# Patient Record
Sex: FEMALE | Employment: FULL TIME
[De-identification: names, ages, dates, MRNs, and addresses within clinical notes are randomized per-mention and may not be internally consistent; named-entity substitution may affect disease eponyms.]

---

## 2024-07-27 ENCOUNTER — APPOINTMENT (OUTPATIENT)
Facility: HOSPITAL | Age: 64
End: 2024-07-27
Payer: COMMERCIAL

## 2024-07-27 ENCOUNTER — HOSPITAL ENCOUNTER (INPATIENT)
Facility: HOSPITAL | Age: 64
LOS: 5 days | Discharge: HOME | End: 2024-08-01
Attending: STUDENT IN AN ORGANIZED HEALTH CARE EDUCATION/TRAINING PROGRAM | Admitting: INTERNAL MEDICINE
Payer: COMMERCIAL

## 2024-07-27 DIAGNOSIS — A41.9 SEPTICEMIA (HCC): Primary | ICD-10-CM

## 2024-07-27 DIAGNOSIS — J18.9 PNEUMONIA OF LEFT LOWER LOBE DUE TO INFECTIOUS ORGANISM: ICD-10-CM

## 2024-07-27 PROBLEM — J90 PLEURAL EFFUSION ON LEFT: Status: ACTIVE | Noted: 2024-07-27

## 2024-07-27 LAB
ALBUMIN SERPL-MCNC: 2.7 G/DL (ref 3.4–5)
ALBUMIN/GLOB SERPL: 0.6 (ref 0.8–1.7)
ALP SERPL-CCNC: 91 U/L (ref 45–117)
ALT SERPL-CCNC: 58 U/L (ref 13–56)
ANION GAP SERPL CALC-SCNC: 12 MMOL/L (ref 3–18)
APPEARANCE UR: ABNORMAL
AST SERPL-CCNC: 94 U/L (ref 10–38)
BACTERIA URNS QL MICRO: ABNORMAL /HPF
BASOPHILS # BLD: 0 K/UL (ref 0–0.1)
BASOPHILS NFR BLD: 0 % (ref 0–2)
BILIRUB SERPL-MCNC: 1.9 MG/DL (ref 0.2–1)
BILIRUB UR QL: NEGATIVE
BUN SERPL-MCNC: 19 MG/DL (ref 7–18)
BUN/CREAT SERPL: 22 (ref 12–20)
CALCIUM SERPL-MCNC: 8.3 MG/DL (ref 8.5–10.1)
CHLORIDE SERPL-SCNC: 95 MMOL/L (ref 100–111)
CO2 SERPL-SCNC: 21 MMOL/L (ref 21–32)
COLOR UR: ABNORMAL
CREAT SERPL-MCNC: 0.86 MG/DL (ref 0.6–1.3)
DIFFERENTIAL METHOD BLD: ABNORMAL
EOSINOPHIL # BLD: 0 K/UL (ref 0–0.4)
EOSINOPHIL NFR BLD: 0 % (ref 0–5)
EPITH CASTS URNS QL MICRO: ABNORMAL /LPF (ref 0–5)
ERYTHROCYTE [DISTWIDTH] IN BLOOD BY AUTOMATED COUNT: 13.6 % (ref 11.6–14.5)
FLUAV AG NPH QL IA: NEGATIVE
FLUBV AG NOSE QL IA: NEGATIVE
GLOBULIN SER CALC-MCNC: 4.4 G/DL (ref 2–4)
GLUCOSE BLD STRIP.AUTO-MCNC: 116 MG/DL (ref 70–110)
GLUCOSE SERPL-MCNC: 102 MG/DL (ref 74–99)
GLUCOSE UR STRIP.AUTO-MCNC: NEGATIVE MG/DL
HCT VFR BLD AUTO: 31.3 % (ref 35–45)
HGB BLD-MCNC: 11 G/DL (ref 12–16)
HGB UR QL STRIP: ABNORMAL
IMM GRANULOCYTES # BLD AUTO: 0.1 K/UL (ref 0–0.04)
IMM GRANULOCYTES NFR BLD AUTO: 1 % (ref 0–0.5)
KETONES UR QL STRIP.AUTO: 15 MG/DL
LACTATE BLD-SCNC: 1.48 MMOL/L (ref 0.4–2)
LEUKOCYTE ESTERASE UR QL STRIP.AUTO: NEGATIVE
LYMPHOCYTES # BLD: 1.2 K/UL (ref 0.9–3.6)
LYMPHOCYTES NFR BLD: 8 % (ref 21–52)
MAGNESIUM SERPL-MCNC: 2.1 MG/DL (ref 1.6–2.6)
MCH RBC QN AUTO: 29.7 PG (ref 24–34)
MCHC RBC AUTO-ENTMCNC: 35.1 G/DL (ref 31–37)
MCV RBC AUTO: 84.6 FL (ref 78–100)
MONOCYTES # BLD: 1.2 K/UL (ref 0.05–1.2)
MONOCYTES NFR BLD: 9 % (ref 3–10)
NEUTS SEG # BLD: 11.3 K/UL (ref 1.8–8)
NEUTS SEG NFR BLD: 82 % (ref 40–73)
NITRITE UR QL STRIP.AUTO: NEGATIVE
NRBC # BLD: 0 K/UL (ref 0–0.01)
NRBC BLD-RTO: 0 PER 100 WBC
NT PRO BNP: 191 PG/ML (ref 0–900)
PH UR STRIP: 5.5 (ref 5–8)
PLATELET # BLD AUTO: 248 K/UL (ref 135–420)
PMV BLD AUTO: 10.6 FL (ref 9.2–11.8)
POTASSIUM SERPL-SCNC: 3.5 MMOL/L (ref 3.5–5.5)
PROCALCITONIN SERPL-MCNC: 2.66 NG/ML
PROT SERPL-MCNC: 7.1 G/DL (ref 6.4–8.2)
PROT UR STRIP-MCNC: ABNORMAL MG/DL
RBC # BLD AUTO: 3.7 M/UL (ref 4.2–5.3)
RBC #/AREA URNS HPF: ABNORMAL /HPF (ref 0–5)
SARS-COV-2 RDRP RESP QL NAA+PROBE: NOT DETECTED
SODIUM SERPL-SCNC: 128 MMOL/L (ref 136–145)
SOURCE: NORMAL
SP GR UR REFRACTOMETRY: 1.01 (ref 1–1.03)
TROPONIN I SERPL HS-MCNC: 13 NG/L (ref 0–54)
TSH SERPL DL<=0.05 MIU/L-ACNC: 1.01 UIU/ML (ref 0.36–3.74)
UROBILINOGEN UR QL STRIP.AUTO: 1 EU/DL (ref 0.2–1)
WBC # BLD AUTO: 13.8 K/UL (ref 4.6–13.2)
WBC URNS QL MICRO: ABNORMAL /HPF (ref 0–4)

## 2024-07-27 PROCEDURE — 84443 ASSAY THYROID STIM HORMONE: CPT

## 2024-07-27 PROCEDURE — 82962 GLUCOSE BLOOD TEST: CPT

## 2024-07-27 PROCEDURE — 2580000003 HC RX 258: Performed by: INTERNAL MEDICINE

## 2024-07-27 PROCEDURE — 80053 COMPREHEN METABOLIC PANEL: CPT

## 2024-07-27 PROCEDURE — 83605 ASSAY OF LACTIC ACID: CPT

## 2024-07-27 PROCEDURE — 71045 X-RAY EXAM CHEST 1 VIEW: CPT

## 2024-07-27 PROCEDURE — 81001 URINALYSIS AUTO W/SCOPE: CPT

## 2024-07-27 PROCEDURE — 87635 SARS-COV-2 COVID-19 AMP PRB: CPT

## 2024-07-27 PROCEDURE — 87040 BLOOD CULTURE FOR BACTERIA: CPT

## 2024-07-27 PROCEDURE — 93005 ELECTROCARDIOGRAM TRACING: CPT | Performed by: STUDENT IN AN ORGANIZED HEALTH CARE EDUCATION/TRAINING PROGRAM

## 2024-07-27 PROCEDURE — 85025 COMPLETE CBC W/AUTO DIFF WBC: CPT

## 2024-07-27 PROCEDURE — 96365 THER/PROPH/DIAG IV INF INIT: CPT

## 2024-07-27 PROCEDURE — 84145 PROCALCITONIN (PCT): CPT

## 2024-07-27 PROCEDURE — 84484 ASSAY OF TROPONIN QUANT: CPT

## 2024-07-27 PROCEDURE — 87804 INFLUENZA ASSAY W/OPTIC: CPT

## 2024-07-27 PROCEDURE — 6370000000 HC RX 637 (ALT 250 FOR IP): Performed by: STUDENT IN AN ORGANIZED HEALTH CARE EDUCATION/TRAINING PROGRAM

## 2024-07-27 PROCEDURE — 83880 ASSAY OF NATRIURETIC PEPTIDE: CPT

## 2024-07-27 PROCEDURE — 6370000000 HC RX 637 (ALT 250 FOR IP): Performed by: INTERNAL MEDICINE

## 2024-07-27 PROCEDURE — 96375 TX/PRO/DX INJ NEW DRUG ADDON: CPT

## 2024-07-27 PROCEDURE — 6360000002 HC RX W HCPCS: Performed by: STUDENT IN AN ORGANIZED HEALTH CARE EDUCATION/TRAINING PROGRAM

## 2024-07-27 PROCEDURE — 99285 EMERGENCY DEPT VISIT HI MDM: CPT

## 2024-07-27 PROCEDURE — 2580000003 HC RX 258: Performed by: STUDENT IN AN ORGANIZED HEALTH CARE EDUCATION/TRAINING PROGRAM

## 2024-07-27 PROCEDURE — 1100000000 HC RM PRIVATE

## 2024-07-27 PROCEDURE — 83735 ASSAY OF MAGNESIUM: CPT

## 2024-07-27 PROCEDURE — 6360000002 HC RX W HCPCS: Performed by: INTERNAL MEDICINE

## 2024-07-27 RX ORDER — ACETAMINOPHEN 650 MG/1
650 SUPPOSITORY RECTAL EVERY 6 HOURS PRN
Status: DISCONTINUED | OUTPATIENT
Start: 2024-07-27 | End: 2024-08-01 | Stop reason: HOSPADM

## 2024-07-27 RX ORDER — ENOXAPARIN SODIUM 100 MG/ML
40 INJECTION SUBCUTANEOUS DAILY
Status: DISCONTINUED | OUTPATIENT
Start: 2024-07-27 | End: 2024-08-01 | Stop reason: HOSPADM

## 2024-07-27 RX ORDER — POTASSIUM CHLORIDE 20 MEQ/1
40 TABLET, EXTENDED RELEASE ORAL PRN
Status: DISCONTINUED | OUTPATIENT
Start: 2024-07-27 | End: 2024-08-01 | Stop reason: HOSPADM

## 2024-07-27 RX ORDER — IPRATROPIUM BROMIDE AND ALBUTEROL SULFATE 2.5; .5 MG/3ML; MG/3ML
1 SOLUTION RESPIRATORY (INHALATION) EVERY 4 HOURS PRN
Status: DISCONTINUED | OUTPATIENT
Start: 2024-07-27 | End: 2024-07-28

## 2024-07-27 RX ORDER — SODIUM CHLORIDE 0.9 % (FLUSH) 0.9 %
5-40 SYRINGE (ML) INJECTION EVERY 12 HOURS SCHEDULED
Status: DISCONTINUED | OUTPATIENT
Start: 2024-07-27 | End: 2024-08-01 | Stop reason: HOSPADM

## 2024-07-27 RX ORDER — 0.9 % SODIUM CHLORIDE 0.9 %
30 INTRAVENOUS SOLUTION INTRAVENOUS ONCE
Status: COMPLETED | OUTPATIENT
Start: 2024-07-27 | End: 2024-07-27

## 2024-07-27 RX ORDER — ONDANSETRON 2 MG/ML
4 INJECTION INTRAMUSCULAR; INTRAVENOUS EVERY 6 HOURS PRN
Status: DISCONTINUED | OUTPATIENT
Start: 2024-07-27 | End: 2024-08-01 | Stop reason: HOSPADM

## 2024-07-27 RX ORDER — SODIUM CHLORIDE 9 MG/ML
INJECTION, SOLUTION INTRAVENOUS PRN
Status: DISCONTINUED | OUTPATIENT
Start: 2024-07-27 | End: 2024-08-01 | Stop reason: HOSPADM

## 2024-07-27 RX ORDER — POTASSIUM CHLORIDE 7.45 MG/ML
10 INJECTION INTRAVENOUS PRN
Status: DISCONTINUED | OUTPATIENT
Start: 2024-07-27 | End: 2024-08-01 | Stop reason: HOSPADM

## 2024-07-27 RX ORDER — ACETAMINOPHEN 500 MG
1000 TABLET ORAL
Status: COMPLETED | OUTPATIENT
Start: 2024-07-27 | End: 2024-07-27

## 2024-07-27 RX ORDER — MECOBALAMIN 5000 MCG
5 TABLET,DISINTEGRATING ORAL NIGHTLY PRN
Status: DISCONTINUED | OUTPATIENT
Start: 2024-07-27 | End: 2024-08-01 | Stop reason: HOSPADM

## 2024-07-27 RX ORDER — MAGNESIUM SULFATE IN WATER 40 MG/ML
2000 INJECTION, SOLUTION INTRAVENOUS PRN
Status: DISCONTINUED | OUTPATIENT
Start: 2024-07-27 | End: 2024-08-01 | Stop reason: HOSPADM

## 2024-07-27 RX ORDER — ACETAMINOPHEN 325 MG/1
650 TABLET ORAL EVERY 6 HOURS PRN
Status: DISCONTINUED | OUTPATIENT
Start: 2024-07-27 | End: 2024-08-01 | Stop reason: HOSPADM

## 2024-07-27 RX ORDER — ONDANSETRON 4 MG/1
4 TABLET, ORALLY DISINTEGRATING ORAL EVERY 8 HOURS PRN
Status: DISCONTINUED | OUTPATIENT
Start: 2024-07-27 | End: 2024-08-01 | Stop reason: HOSPADM

## 2024-07-27 RX ORDER — IPRATROPIUM BROMIDE AND ALBUTEROL SULFATE 2.5; .5 MG/3ML; MG/3ML
1 SOLUTION RESPIRATORY (INHALATION)
Status: DISCONTINUED | OUTPATIENT
Start: 2024-07-28 | End: 2024-07-28

## 2024-07-27 RX ORDER — GUAIFENESIN 600 MG/1
600 TABLET, EXTENDED RELEASE ORAL 2 TIMES DAILY
Status: DISCONTINUED | OUTPATIENT
Start: 2024-07-27 | End: 2024-07-28

## 2024-07-27 RX ORDER — SODIUM CHLORIDE 0.9 % (FLUSH) 0.9 %
5-40 SYRINGE (ML) INJECTION PRN
Status: DISCONTINUED | OUTPATIENT
Start: 2024-07-27 | End: 2024-08-01 | Stop reason: HOSPADM

## 2024-07-27 RX ORDER — IBUPROFEN 600 MG/1
600 TABLET ORAL
Status: COMPLETED | OUTPATIENT
Start: 2024-07-27 | End: 2024-07-27

## 2024-07-27 RX ORDER — POLYETHYLENE GLYCOL 3350 17 G/17G
17 POWDER, FOR SOLUTION ORAL DAILY PRN
Status: DISCONTINUED | OUTPATIENT
Start: 2024-07-27 | End: 2024-08-01 | Stop reason: HOSPADM

## 2024-07-27 RX ADMIN — GUAIFENESIN 600 MG: 600 TABLET, EXTENDED RELEASE ORAL at 21:40

## 2024-07-27 RX ADMIN — ENOXAPARIN SODIUM 40 MG: 100 INJECTION SUBCUTANEOUS at 21:40

## 2024-07-27 RX ADMIN — ACETAMINOPHEN 1000 MG: 500 TABLET ORAL at 09:20

## 2024-07-27 RX ADMIN — SODIUM CHLORIDE, PRESERVATIVE FREE 10 ML: 5 INJECTION INTRAVENOUS at 21:49

## 2024-07-27 RX ADMIN — SODIUM CHLORIDE 2000 ML: 9 INJECTION, SOLUTION INTRAVENOUS at 10:44

## 2024-07-27 RX ADMIN — IBUPROFEN 600 MG: 600 TABLET, FILM COATED ORAL at 09:20

## 2024-07-27 RX ADMIN — AZITHROMYCIN MONOHYDRATE 500 MG: 500 INJECTION, POWDER, LYOPHILIZED, FOR SOLUTION INTRAVENOUS at 10:48

## 2024-07-27 RX ADMIN — WATER 1000 MG: 1 INJECTION INTRAMUSCULAR; INTRAVENOUS; SUBCUTANEOUS at 10:45

## 2024-07-27 ASSESSMENT — PAIN SCALES - GENERAL
PAINLEVEL_OUTOF10: 0
PAINLEVEL_OUTOF10: 7

## 2024-07-27 ASSESSMENT — PAIN DESCRIPTION - LOCATION: LOCATION: HEAD

## 2024-07-27 ASSESSMENT — PAIN - FUNCTIONAL ASSESSMENT: PAIN_FUNCTIONAL_ASSESSMENT: 0-10

## 2024-07-27 ASSESSMENT — PAIN DESCRIPTION - DESCRIPTORS: DESCRIPTORS: ACHING

## 2024-07-27 NOTE — PLAN OF CARE
Problem: Pain  Goal: Verbalizes/displays adequate comfort level or baseline comfort level  Outcome: Progressing     Problem: Respiratory - Adult  Goal: Achieves optimal ventilation and oxygenation  Outcome: Progressing     Problem: Discharge Planning  Goal: Discharge to home or other facility with appropriate resources  Outcome: Progressing     Problem: Metabolic/Fluid and Electrolytes - Adult  Goal: Electrolytes maintained within normal limits  Outcome: Progressing  Goal: Hemodynamic stability and optimal renal function maintained  Outcome: Progressing  Goal: Glucose maintained within prescribed range  Outcome: Progressing

## 2024-07-27 NOTE — ED NOTES
TRANSFER - OUT REPORT:    Verbal report given to Maureen العراقي RN on Diana Gastelum  being transferred to [facility] for routine progression of patient care       Report consisted of patient's Situation, Background, Assessment and   Recommendations(SBAR).     Information from the following report(s) ED Encounter Summary was reviewed with the receiving nurse.    Horace Fall Assessment:    Presents to emergency department  because of falls (Syncope, seizure, or loss of consciousness): No  Age > 70: No  Altered Mental Status, Intoxication with alcohol or substance confusion (Disorientation, impaired judgment, poor safety awaremess, or inability to follow instructions): No  Impaired Mobility: Ambulates or transfers with assistive devices or assistance; Unable to ambulate or transer.: No  Nursing Judgement: No          Lines:   Peripheral IV 07/27/24 Right Arm (Active)   Site Assessment Clean, dry & intact;Dry 07/27/24 1043   Line Status Brisk blood return 07/27/24 1043   Line Care Connections checked and tightened 07/27/24 1043   Phlebitis Assessment No symptoms 07/27/24 1043   Infiltration Assessment 0 07/27/24 1043   Dressing Status Clean, dry & intact;Dry 07/27/24 1043   Dressing Type Transparent 07/27/24 1043        Opportunity for questions and clarification was provided.      Patient transported with:  Monitor

## 2024-07-27 NOTE — ED PROVIDER NOTES
LACTIC ACID (LACTATE)   POCT GLUCOSE       All other labs were within normal range or not returned as of this dictation.    EMERGENCY DEPARTMENT COURSE and DIFFERENTIAL DIAGNOSIS/MDM:   Vitals:    Vitals:    07/27/24 0945 07/27/24 1000 07/27/24 1030 07/27/24 1100   BP: 114/72 100/79 114/70    Pulse:   (!) 105    Resp:       Temp:    98.9 °F (37.2 °C)   TempSrc:    Oral   SpO2: 94% 93% 94%    Weight:       Height:           Medical Decision Making  Amount and/or Complexity of Data Reviewed  Labs: ordered.  Radiology: ordered.  ECG/medicine tests: ordered.    Risk  OTC drugs.  Prescription drug management.  Decision regarding hospitalization.    Patient is a 63-year-old female presenting with multiple complaints.  Patient could be suffering from a viral URI however concerning that she may be developing pneumonia.  She is currently febrile and tachycardic and has crackles on her lung exam.  Will do a code sepsis, patient be started on fluids and antibiotics.  Will evaluate for alternative sources for her symptoms.  She will be given Tylenol and ibuprofen to help bring down her fever.  She will be given ceftriaxone and azithromycin as I think that pneumonia is the most likely source of her infection.    Chest x-ray shows an obvious pneumonia to the left lower lobe    Lactic acid within normal limits.    Flu and COVID are negative.  CBC shows leukocytosis and normocytic anemia of unknown baseline.  TSH is within normal limits.  CMP shows a low sodium.  Patient already given fluids.  An elevated BUN again patient given fluids a low albumin and elevation in her LFTs.  Patient denies any alcohol use.  Denies any known liver issues.  Pro-Austin is markedly elevated at 2.66.  Troponin within normal limits.    Patient is maintaining with oxygen sats in the low 90s.  She is able to get up and ambulate and she does not become hypoxic stays in the low 90s.  Overall she states she feels improved and clinically she looks much better  after fluids and antibiotics.  Discussed different treatment options moving forward.  She does not have any significant medical history we did consider outpatient treatment for this however my concern is is that she could still be septic from this pneumonia, elevated LFTs and a low sodium, therefore think that she would be better served in the hospital.  Especially since she does not have a primary care physician it would be hard for her to get close outpatient follow-up.  Therefore patient agreed with this plan at this time.    Patient is admitted to Dr. Min with the hospitalist service.  Patient is in agreement with the plan to be admitted at this time.          CRITICAL CARE TIME   Total Critical Care time was 40 minutes, excluding separately reportable procedures.  There was a high probability of clinically significant/life threatening deterioration in the patient's condition which required my urgent intervention.     CONSULTS:  None    PROCEDURES:  Unless otherwise noted below, none     Procedures      FINAL IMPRESSION      1. Septicemia (HCC)    2. Pneumonia of left lower lobe due to infectious organism          DISPOSITION/PLAN   DISPOSITION Admitted 07/27/2024 01:21:03 PM      PATIENT REFERRED TO:  No follow-up provider specified.    DISCHARGE MEDICATIONS:  New Prescriptions    No medications on file     Controlled Substances Monitoring:          No data to display                (Please note that portions of this note were completed with a voice recognition program.  Efforts were made to edit the dictations but occasionally words are mis-transcribed.)    Maureen Herrera MD (electronically signed)  Attending Emergency Physician            Maureen Herrera MD  07/27/24 9262

## 2024-07-27 NOTE — H&P
History and Physical    Patient: Diana Gastelum MRN: [id number]  SSN: [id number]    YOB: 1960  Age: 63 y.o.  Sex: female      Subjective:      Diana Gastelum is a 63 y.o. female who presents to [facility] ([facility]) ER with complaint of Cough, Shortness of Breath, and Fatigue.  Patient reports that she has been having a cough productive of clear phlegm without chest congestion and without pain with inspiration, but with intermittent fevers and chills over approximately the last week.  Patient reports that she has been having a headache during this time period and diarrhea for approximately four days.  Patient reports that she has not taken any antibiotics recently.  Patient reports that she has a Past Medical History of Bilateral Hearing Loss for which she used to wear Hearing Aids (which she reportedly lost).  Patient states that she has a Past Medical History of Mastoidectomy.  Patient reports that she takes no medications at home and has no Home Pharmacy.  Patient reports that she Smokes 0.25-0.3 PPD x2 months and ostensibly started Smoking due to the recently loss of her  who passed away.  Patient denies nausea and dysuria.    In [facility] ER, Patient is noted to have Temperature 101.0°F, Heart Rate 117 bpm, Respiration Rate 22 bpm, Blood Pressure 100/66 mm Hg to 134/80 mm Hg (believe DBP of 107 mm Hg was erroneous), SpO2 93% on Room Air, WBC 13.8 K/uL, Hgb 11.0 g/dL, Neut% 82%, Neut# 11.3, UA shows small hematuria, Lactic Acid 1.48 mmol/L, Na+ 128 mmol/L, K+ 3.5 mmol/L, Cl- 95 mmol/L, BUN 19 mg/dL, Creatinine 0.86 mg/dL (Unknown), eGFR 77, Glucose 116 mg/dL, Albumin 2.7 g/dL, ALT 58 U/L, AST 94 U/L, Total Bilirubin 1.9 mg/dL, Troponin 13 ng/L, Pro- pg/mL, Procalcitonin 2.66 ng/mL, TSH 1.01 uIU/mL, Influenza A/B (-), and SARS-CoV-2 (-).  CXR has been read by Radiologist to show \"Fairly extensive region of airspace disease/infiltrate centered in the left

## 2024-07-27 NOTE — PROGRESS NOTES
4 Eyes Skin Assessment     NAME:  Diana Gastelum  YOB: 1960  MEDICAL RECORD NUMBER:  [id number]    The patient is being assessed for  Admission    I agree that at least one RN has performed a thorough Head to Toe Skin Assessment on the patient. ALL assessment sites listed below have been assessed.      Areas assessed by both nurses:    Head, Face, Ears, Shoulders, Back, Chest, Arms, Elbows, Hands, Sacrum. Buttock, Coccyx, Ischium, Legs. Feet and Heels, and Under Medical Devices         Does the Patient have a Wound? No noted wound(s)       Nader Prevention initiated by RN: No  Wound Care Orders initiated by RN: No    Pressure Injury (Stage 3,4, Unstageable, DTI, NWPT, and Complex wounds) if present, place Wound referral order by RN under : No    New Ostomies, if present place, Ostomy referral order under : No     Nurse 1 eSignature: Electronically signed by Maureen العراقي RN on 7/27/24 at 6:28 PM EDT    **SHARE this note so that the co-signing nurse can place an eSignature**    Nurse 2 eSignature: Electronically signed by Milena Booth RN on 7/27/24 at 6:32 PM EDT

## 2024-07-27 NOTE — PROGRESS NOTES
INTERIM UPDATE - 1320 EST on 7/27/2024    [facility] ([facility]) ER Physician calls requesting admission for a 63-year-old female who presents with Cough, Shortness of Breath, and Fatigue.  Patient reportedly had been having approximately 1 week of symptoms and also some diarrhea.    [facility] ER Physician states that Patient has SpO2 94% on Room Air and is moving around well.  However, Patient has a Temperature of 101.0°F, Tachypnea (22 bpm), Tachycardia (117 bpm) with a negative Lactic Acid (1.48 mmol/L).  CXR reportedly shows a Left Mid-lung infiltration.  Patient otherwise has a CURB-65 score of 0.    [facility] ER Physician reports that Patient has no Primary Care Provider (a.k.a. PCP).  [facility] ER Physician reports that there is \"nothing in Patient's other Chart\" to review.    [facility] ER Physician states that Patient was covered with IV Ceftriaxone and IV Azithromycin.  Patient received no outpatient therapy.    [facility] ([facility]) ER Physician reports that Patient is hemodynamically stable, requires no ancillary oxygen, and exhibits no arrhythmia.    Plan:  Will admit Patient to [facility] ([facility]) Medical/Surgical Unit for management of Sepsis 2°/2 Left Community-Acquired Pneumonia (a.k.a. CAP).

## 2024-07-27 NOTE — ED TRIAGE NOTES
Patient arrived to ER via EMS from [facility]. Patient states she has been feeling sick for a week, heart rate elevated and low oxygen so UC called EMS. Patient arrived to ER ST, oxygen 94% on room air.

## 2024-07-28 PROBLEM — H91.93 BILATERAL HEARING LOSS: Status: ACTIVE | Noted: 2024-07-28

## 2024-07-28 PROBLEM — J98.01 ACUTE BRONCHOSPASM: Status: ACTIVE | Noted: 2024-07-28

## 2024-07-28 PROBLEM — D64.9 NORMOCYTIC ANEMIA: Status: ACTIVE | Noted: 2024-07-28

## 2024-07-28 PROBLEM — Z72.0 TOBACCO USE: Status: ACTIVE | Noted: 2024-07-28

## 2024-07-28 PROBLEM — Z72.0 TOBACCO USE: Chronic | Status: ACTIVE | Noted: 2024-07-28

## 2024-07-28 PROBLEM — J98.01 ACUTE BRONCHOSPASM: Status: RESOLVED | Noted: 2024-07-28 | Resolved: 2024-07-28

## 2024-07-28 PROBLEM — E87.6 HYPOKALEMIA: Status: ACTIVE | Noted: 2024-07-28

## 2024-07-28 PROBLEM — H91.93 BILATERAL HEARING LOSS: Chronic | Status: ACTIVE | Noted: 2024-07-28

## 2024-07-28 PROBLEM — E87.1 HYPONATREMIA: Status: ACTIVE | Noted: 2024-07-28

## 2024-07-28 LAB
ALBUMIN SERPL-MCNC: 2.2 G/DL (ref 3.4–5)
ALBUMIN/GLOB SERPL: 0.5 (ref 0.8–1.7)
ALP SERPL-CCNC: 76 U/L (ref 45–117)
ALT SERPL-CCNC: 40 U/L (ref 13–56)
ANION GAP SERPL CALC-SCNC: 8 MMOL/L (ref 3–18)
AST SERPL-CCNC: 59 U/L (ref 10–38)
B PERT DNA SPEC QL NAA+PROBE: NOT DETECTED
BASOPHILS # BLD: 0 K/UL (ref 0–0.1)
BASOPHILS NFR BLD: 0 % (ref 0–2)
BILIRUB SERPL-MCNC: 0.9 MG/DL (ref 0.2–1)
BORDETELLA PARAPERTUSSIS BY PCR: NOT DETECTED
BUN SERPL-MCNC: 16 MG/DL (ref 7–18)
BUN/CREAT SERPL: 25 (ref 12–20)
C PNEUM DNA SPEC QL NAA+PROBE: NOT DETECTED
CALCIUM SERPL-MCNC: 8.7 MG/DL (ref 8.5–10.1)
CHLORIDE SERPL-SCNC: 103 MMOL/L (ref 100–111)
CO2 SERPL-SCNC: 23 MMOL/L (ref 21–32)
CREAT SERPL-MCNC: 0.64 MG/DL (ref 0.6–1.3)
DIFFERENTIAL METHOD BLD: ABNORMAL
EKG ATRIAL RATE: 118 BPM
EKG DIAGNOSIS: NORMAL
EKG P AXIS: 62 DEGREES
EKG P-R INTERVAL: 126 MS
EKG Q-T INTERVAL: 284 MS
EKG QRS DURATION: 84 MS
EKG QTC CALCULATION (BAZETT): 398 MS
EKG R AXIS: 51 DEGREES
EKG T AXIS: -25 DEGREES
EKG VENTRICULAR RATE: 118 BPM
EOSINOPHIL # BLD: 0 K/UL (ref 0–0.4)
EOSINOPHIL NFR BLD: 0 % (ref 0–5)
ERYTHROCYTE [DISTWIDTH] IN BLOOD BY AUTOMATED COUNT: 13.8 % (ref 11.6–14.5)
FLUAV SUBTYP SPEC NAA+PROBE: NOT DETECTED
FLUBV RNA SPEC QL NAA+PROBE: NOT DETECTED
GLOBULIN SER CALC-MCNC: 4.6 G/DL (ref 2–4)
GLUCOSE SERPL-MCNC: 101 MG/DL (ref 74–99)
HADV DNA SPEC QL NAA+PROBE: NOT DETECTED
HCOV 229E RNA SPEC QL NAA+PROBE: NOT DETECTED
HCOV HKU1 RNA SPEC QL NAA+PROBE: NOT DETECTED
HCOV NL63 RNA SPEC QL NAA+PROBE: NOT DETECTED
HCOV OC43 RNA SPEC QL NAA+PROBE: NOT DETECTED
HCT VFR BLD AUTO: 29.6 % (ref 35–45)
HGB BLD-MCNC: 9.9 G/DL (ref 12–16)
HMPV RNA SPEC QL NAA+PROBE: NOT DETECTED
HPIV1 RNA SPEC QL NAA+PROBE: NOT DETECTED
HPIV2 RNA SPEC QL NAA+PROBE: NOT DETECTED
HPIV3 RNA SPEC QL NAA+PROBE: NOT DETECTED
HPIV4 RNA SPEC QL NAA+PROBE: NOT DETECTED
IMM GRANULOCYTES # BLD AUTO: 0.1 K/UL (ref 0–0.04)
IMM GRANULOCYTES NFR BLD AUTO: 1 % (ref 0–0.5)
LYMPHOCYTES # BLD: 1.2 K/UL (ref 0.9–3.6)
LYMPHOCYTES NFR BLD: 12 % (ref 21–52)
M PNEUMO DNA SPEC QL NAA+PROBE: NOT DETECTED
MAGNESIUM SERPL-MCNC: 2.4 MG/DL (ref 1.6–2.6)
MCH RBC QN AUTO: 29.1 PG (ref 24–34)
MCHC RBC AUTO-ENTMCNC: 33.4 G/DL (ref 31–37)
MCV RBC AUTO: 87.1 FL (ref 78–100)
MONOCYTES # BLD: 0.9 K/UL (ref 0.05–1.2)
MONOCYTES NFR BLD: 9 % (ref 3–10)
NEUTS SEG # BLD: 7.9 K/UL (ref 1.8–8)
NEUTS SEG NFR BLD: 78 % (ref 40–73)
NRBC # BLD: 0 K/UL (ref 0–0.01)
NRBC BLD-RTO: 0 PER 100 WBC
PLATELET # BLD AUTO: 249 K/UL (ref 135–420)
PMV BLD AUTO: 10.7 FL (ref 9.2–11.8)
POTASSIUM SERPL-SCNC: 3.3 MMOL/L (ref 3.5–5.5)
PROT SERPL-MCNC: 6.8 G/DL (ref 6.4–8.2)
RBC # BLD AUTO: 3.4 M/UL (ref 4.2–5.3)
RSV RNA SPEC QL NAA+PROBE: NOT DETECTED
RV+EV RNA SPEC QL NAA+PROBE: NOT DETECTED
SARS-COV-2 RNA RESP QL NAA+PROBE: NOT DETECTED
SODIUM SERPL-SCNC: 134 MMOL/L (ref 136–145)
WBC # BLD AUTO: 10.2 K/UL (ref 4.6–13.2)

## 2024-07-28 PROCEDURE — 6370000000 HC RX 637 (ALT 250 FOR IP): Performed by: STUDENT IN AN ORGANIZED HEALTH CARE EDUCATION/TRAINING PROGRAM

## 2024-07-28 PROCEDURE — 36415 COLL VENOUS BLD VENIPUNCTURE: CPT

## 2024-07-28 PROCEDURE — 87324 CLOSTRIDIUM AG IA: CPT

## 2024-07-28 PROCEDURE — 93010 ELECTROCARDIOGRAM REPORT: CPT | Performed by: INTERNAL MEDICINE

## 2024-07-28 PROCEDURE — 1100000000 HC RM PRIVATE

## 2024-07-28 PROCEDURE — 94761 N-INVAS EAR/PLS OXIMETRY MLT: CPT

## 2024-07-28 PROCEDURE — 2580000003 HC RX 258: Performed by: STUDENT IN AN ORGANIZED HEALTH CARE EDUCATION/TRAINING PROGRAM

## 2024-07-28 PROCEDURE — 0202U NFCT DS 22 TRGT SARS-COV-2: CPT

## 2024-07-28 PROCEDURE — 83735 ASSAY OF MAGNESIUM: CPT

## 2024-07-28 PROCEDURE — 99233 SBSQ HOSP IP/OBS HIGH 50: CPT | Performed by: STUDENT IN AN ORGANIZED HEALTH CARE EDUCATION/TRAINING PROGRAM

## 2024-07-28 PROCEDURE — 6360000002 HC RX W HCPCS: Performed by: INTERNAL MEDICINE

## 2024-07-28 PROCEDURE — 85025 COMPLETE CBC W/AUTO DIFF WBC: CPT

## 2024-07-28 PROCEDURE — 80053 COMPREHEN METABOLIC PANEL: CPT

## 2024-07-28 PROCEDURE — 94640 AIRWAY INHALATION TREATMENT: CPT

## 2024-07-28 PROCEDURE — 6370000000 HC RX 637 (ALT 250 FOR IP): Performed by: INTERNAL MEDICINE

## 2024-07-28 PROCEDURE — 2580000003 HC RX 258: Performed by: INTERNAL MEDICINE

## 2024-07-28 PROCEDURE — 87449 NOS EACH ORGANISM AG IA: CPT

## 2024-07-28 RX ORDER — IPRATROPIUM BROMIDE AND ALBUTEROL SULFATE 2.5; .5 MG/3ML; MG/3ML
1 SOLUTION RESPIRATORY (INHALATION)
Status: DISCONTINUED | OUTPATIENT
Start: 2024-07-28 | End: 2024-07-28

## 2024-07-28 RX ORDER — IPRATROPIUM BROMIDE AND ALBUTEROL SULFATE 2.5; .5 MG/3ML; MG/3ML
1 SOLUTION RESPIRATORY (INHALATION) EVERY 4 HOURS PRN
Status: DISCONTINUED | OUTPATIENT
Start: 2024-07-28 | End: 2024-07-29

## 2024-07-28 RX ORDER — GUAIFENESIN 200 MG/10ML
200 LIQUID ORAL EVERY 4 HOURS PRN
Status: DISCONTINUED | OUTPATIENT
Start: 2024-07-28 | End: 2024-07-30

## 2024-07-28 RX ORDER — IBUPROFEN 400 MG/1
400 TABLET ORAL EVERY 6 HOURS PRN
Status: DISCONTINUED | OUTPATIENT
Start: 2024-07-28 | End: 2024-08-01 | Stop reason: HOSPADM

## 2024-07-28 RX ORDER — SODIUM CHLORIDE 9 MG/ML
INJECTION, SOLUTION INTRAVENOUS CONTINUOUS
Status: DISCONTINUED | OUTPATIENT
Start: 2024-07-28 | End: 2024-07-29

## 2024-07-28 RX ADMIN — SODIUM CHLORIDE: 9 INJECTION, SOLUTION INTRAVENOUS at 19:03

## 2024-07-28 RX ADMIN — IPRATROPIUM BROMIDE AND ALBUTEROL SULFATE 1 DOSE: .5; 3 SOLUTION RESPIRATORY (INHALATION) at 10:05

## 2024-07-28 RX ADMIN — SODIUM CHLORIDE, PRESERVATIVE FREE 10 ML: 5 INJECTION INTRAVENOUS at 21:17

## 2024-07-28 RX ADMIN — ACETAMINOPHEN 325MG 650 MG: 325 TABLET ORAL at 04:01

## 2024-07-28 RX ADMIN — IPRATROPIUM BROMIDE AND ALBUTEROL SULFATE 1 DOSE: .5; 3 SOLUTION RESPIRATORY (INHALATION) at 19:59

## 2024-07-28 RX ADMIN — IPRATROPIUM BROMIDE AND ALBUTEROL SULFATE 1 DOSE: .5; 3 SOLUTION RESPIRATORY (INHALATION) at 15:18

## 2024-07-28 RX ADMIN — ENOXAPARIN SODIUM 40 MG: 100 INJECTION SUBCUTANEOUS at 09:26

## 2024-07-28 RX ADMIN — SODIUM CHLORIDE: 9 INJECTION, SOLUTION INTRAVENOUS at 19:01

## 2024-07-28 RX ADMIN — ACETAMINOPHEN 325MG 650 MG: 325 TABLET ORAL at 14:24

## 2024-07-28 RX ADMIN — SODIUM CHLORIDE, PRESERVATIVE FREE 10 ML: 5 INJECTION INTRAVENOUS at 09:28

## 2024-07-28 RX ADMIN — WATER 1000 MG: 1 INJECTION INTRAMUSCULAR; INTRAVENOUS; SUBCUTANEOUS at 09:43

## 2024-07-28 RX ADMIN — AZITHROMYCIN MONOHYDRATE 500 MG: 500 INJECTION, POWDER, LYOPHILIZED, FOR SOLUTION INTRAVENOUS at 09:49

## 2024-07-28 ASSESSMENT — PAIN SCALES - GENERAL
PAINLEVEL_OUTOF10: 0

## 2024-07-28 NOTE — PROGRESS NOTES
[facility] Hospitalist Group  Progress Note    Patient: Diana Gastelum Age: 63 y.o. : 1960 MR#: [id number] SSN: [id number]  Date: 2024                 DVT Prophylaxis:  [x]Lovenox  []Hep SQ  []SCDs  []Coumadin   []On Heparin gtt []PO anticoagulant    Anticipated discharge: 2024 to home    Subjective:     The patient is new to me today.  She was seen and examined at the bedside in follow-up for sepsis secondary to community-acquired pneumonia.  Her sister was also in the room.  Patient appeared tired during the encounter.  She complained of a dry cough, mild shortness of breath and headache.  She denied any chest pain, nausea, vomiting or abdominal pain.  Patient also reported multiple episodes of loose stools for a week.  She denied any sick contacts.  She smokes a pack of cigarettes every 3 days.      Objective:   VS: /71   Pulse 86   Temp 98.8 °F (37.1 °C) (Oral)   Resp 18   Ht 1.626 m (5' 4\")   Wt 65.8 kg (145 lb)   SpO2 96%   PF 98 L/min   BMI 24.89 kg/m²    Tmax/24hrs: Temp (24hrs), Av.3 °F (37.4 °C), Min:97.6 °F (36.4 °C), Max:102.4 °F (39.1 °C)    Intake/Output Summary (Last 24 hours) at 2024 1739  Last data filed at 2024 0951  Gross per 24 hour   Intake 251.88 ml   Output --   Net 251.88 ml        PHYSICAL EXAM  General Appearance: Looks tired  HENT: normocephalic/atraumatic, moist mucus membranes  Neck: No JVD, supple  Lungs: Faint expiratory wheezing auscultated over the right lung posteriorly; inspiratory crackles auscultated over the left lung  CV: RRR, no m/r/g  Abdomen: soft, non-tender, normal bowel sounds  Extremities: no cyanosis, no peripheral edema  Neuro: No focal deficits, motor/sensory intact  Skin: Normal color, intact  Psych: appropriate affect, alert and oriented to person, place and time    Current Facility-Administered Medications   Medication Dose Route Frequency    guaiFENesin (ROBITUSSIN) 100 MG/5ML  BUN/Creatinine Ratio 25 (H) 12 - 20      Est, Glom Filt Rate >90 >60 ml/min/1.73m2    Calcium 8.7 8.5 - 10.1 MG/DL    Total Bilirubin 0.9 0.2 - 1.0 MG/DL    ALT 40 13 - 56 U/L    AST 59 (H) 10 - 38 U/L    Alk Phosphatase 76 45 - 117 U/L    Total Protein 6.8 6.4 - 8.2 g/dL    Albumin 2.2 (L) 3.4 - 5.0 g/dL    Globulin 4.6 (H) 2.0 - 4.0 g/dL    Albumin/Globulin Ratio 0.5 (L) 0.8 - 1.7     CBC with Auto Differential    Collection Time: 07/28/24  6:28 AM   Result Value Ref Range    WBC 10.2 4.6 - 13.2 K/uL    RBC 3.40 (L) 4.20 - 5.30 M/uL    Hemoglobin 9.9 (L) 12.0 - 16.0 g/dL    Hematocrit 29.6 (L) 35.0 - 45.0 %    MCV 87.1 78.0 - 100.0 FL    MCH 29.1 24.0 - 34.0 PG    MCHC 33.4 31.0 - 37.0 g/dL    RDW 13.8 11.6 - 14.5 %    Platelets 249 135 - 420 K/uL    MPV 10.7 9.2 - 11.8 FL    Nucleated RBCs 0.0 0  WBC    nRBC 0.00 0.00 - 0.01 K/uL    Neutrophils % 78 (H) 40 - 73 %    Lymphocytes % 12 (L) 21 - 52 %    Monocytes % 9 3 - 10 %    Eosinophils % 0 0 - 5 %    Basophils % 0 0 - 2 %    Immature Granulocytes % 1 (H) 0.0 - 0.5 %    Neutrophils Absolute 7.9 1.8 - 8.0 K/UL    Lymphocytes Absolute 1.2 0.9 - 3.6 K/UL    Monocytes Absolute 0.9 0.05 - 1.2 K/UL    Eosinophils Absolute 0.0 0.0 - 0.4 K/UL    Basophils Absolute 0.0 0.0 - 0.1 K/UL    Immature Granulocytes Absolute 0.1 (H) 0.00 - 0.04 K/UL    Differential Type AUTOMATED     Magnesium    Collection Time: 07/28/24  6:28 AM   Result Value Ref Range    Magnesium 2.4 1.6 - 2.6 mg/dL   Respiratory Panel, Molecular, with COVID-19 (Restricted: peds pts or suitable admitted adults)    Collection Time: 07/28/24  4:00 PM    Specimen: Nasopharyngeal   Result Value Ref Range    Adenovirus by PCR Not detected NOTD      Coronavirus 229E by PCR Not detected NOTD      Coronavirus HKU1 by PCR Not detected NOTD      Coronavirus NL63 by PCR Not detected NOTD      Coronavirus OC43 by PCR Not detected NOTD      SARS-CoV-2, PCR Not detected NOTD      Human Metapneumovirus by PCR Not

## 2024-07-28 NOTE — PROGRESS NOTES
conducted an initial consultation and Spiritual Assessment for Diana Gastelum, who is a 63 y.o.,female. Patient’s Primary Language is: English.   According to the patient’s EMR Alevism Affiliation is: Episcopal.     The reason the Patient came to the hospital is:   Patient Active Problem List    Diagnosis Date Noted    Bilateral hearing loss 07/28/2024    Tobacco use 07/28/2024    Hyponatremia 07/28/2024    Hypokalemia 07/28/2024    Normocytic anemia 07/28/2024    Sepsis (HCC) 07/27/2024    Community acquired pneumonia of left lower lobe of lung 07/27/2024    Pleural effusion on left 07/27/2024        The  provided the following Interventions:   attempted to Initiate a relationship of care and support. With patient in bed 472 where she was admitted to the hospital 22 hours ago. Patient is barely able to communicate and is very tired due to here sepsis.  Listened empathically. There is no advance directive on file and patient is not in any condition to undertake the completion of this document.  Provided chaplaincy education.  Provided information about Spiritual Care Services.  Offered prayer  on patients behalf.   .    Assessment:  Patient does not have any Hoahaoism/cultural needs that will affect patient’s preferences in health care.  There are no further spiritual or Hoahaoism issues which require Spiritual Care Services interventions at this time.       Plan:  Chaplains will continue to follow and will provide pastoral care on an as needed/requested basis    .frances Gooden   Board Certified    Spiritual Care   [phone]

## 2024-07-28 NOTE — PLAN OF CARE
Problem: Pain  Goal: Verbalizes/displays adequate comfort level or baseline comfort level  7/28/2024 1007 by Humberto Booth RN  Outcome: Progressing  7/28/2024 0459 by Camila Palacios RN  Outcome: Progressing  Flowsheets (Taken 7/27/2024 1745 by Maureen العراقي, RN)  Verbalizes/displays adequate comfort level or baseline comfort level:   Encourage patient to monitor pain and request assistance   Assess pain using appropriate pain scale   Administer analgesics based on type and severity of pain and evaluate response   Implement non-pharmacological measures as appropriate and evaluate response   Consider cultural and social influences on pain and pain management   Notify Licensed Independent Practitioner if interventions unsuccessful or patient reports new pain     Problem: Respiratory - Adult  Goal: Achieves optimal ventilation and oxygenation  7/28/2024 1007 by Humberto Booth RN  Outcome: Progressing  7/28/2024 0459 by Camila Palacios RN  Outcome: Progressing     Problem: Discharge Planning  Goal: Discharge to home or other facility with appropriate resources  7/28/2024 1007 by Humberto Booth RN  Outcome: Progressing  7/28/2024 0459 by Camila Palacios RN  Outcome: Progressing  Flowsheets (Taken 7/27/2024 1755 by Maureen العراقي RN)  Discharge to home or other facility with appropriate resources:   Identify barriers to discharge with patient and caregiver   Arrange for needed discharge resources and transportation as appropriate   Identify discharge learning needs (meds, wound care, etc)   Refer to discharge planning if patient needs post-hospital services based on physician order or complex needs related to functional status, cognitive ability or social support system     Problem: Metabolic/Fluid and Electrolytes - Adult  Goal: Electrolytes maintained within normal limits  7/28/2024 1007 by Humberto Booth RN  Outcome: Progressing  7/28/2024 0459 by Camila Palacios RN  Outcome: Progressing  Goal:  IV discontinued, cath removed intact

## 2024-07-28 NOTE — PLAN OF CARE
Problem: Pain  Goal: Verbalizes/displays adequate comfort level or baseline comfort level  7/28/2024 0459 by Camila Palacios RN  Outcome: Progressing  Flowsheets (Taken 7/27/2024 1745 by Maureen العراقي RN)  Verbalizes/displays adequate comfort level or baseline comfort level:   Encourage patient to monitor pain and request assistance   Assess pain using appropriate pain scale   Administer analgesics based on type and severity of pain and evaluate response   Implement non-pharmacological measures as appropriate and evaluate response   Consider cultural and social influences on pain and pain management   Notify Licensed Independent Practitioner if interventions unsuccessful or patient reports new pain  7/27/2024 1741 by Maureen العراقي, RN  Outcome: Progressing     Problem: Respiratory - Adult  Goal: Achieves optimal ventilation and oxygenation  7/28/2024 0459 by Camila Palacios RN  Outcome: Progressing  7/27/2024 1741 by Maureen العراقي RN  Outcome: Progressing     Problem: Metabolic/Fluid and Electrolytes - Adult  Goal: Hemodynamic stability and optimal renal function maintained  7/28/2024 0459 by Camila Palacios RN  Outcome: Progressing  7/27/2024 1741 by Maureen العراقي RN  Outcome: Progressing     Problem: Discharge Planning  Goal: Discharge to home or other facility with appropriate resources  7/28/2024 0459 by Camila Palacios RN  Outcome: Progressing  Flowsheets (Taken 7/27/2024 1755 by Maureen العراقي RN)  Discharge to home or other facility with appropriate resources:   Identify barriers to discharge with patient and caregiver   Arrange for needed discharge resources and transportation as appropriate   Identify discharge learning needs (meds, wound care, etc)   Refer to discharge planning if patient needs post-hospital services based on physician order or complex needs related to functional status, cognitive ability or social support system  7/27/2024 1741 by Maureen العراقي, RN  Outcome: Progressing

## 2024-07-29 ENCOUNTER — APPOINTMENT (OUTPATIENT)
Facility: HOSPITAL | Age: 64
End: 2024-07-29
Payer: COMMERCIAL

## 2024-07-29 LAB
ALBUMIN SERPL-MCNC: 2.4 G/DL (ref 3.4–5)
ALBUMIN/GLOB SERPL: 0.5 (ref 0.8–1.7)
ALP SERPL-CCNC: 98 U/L (ref 45–117)
ALT SERPL-CCNC: 41 U/L (ref 13–56)
ANION GAP SERPL CALC-SCNC: 8 MMOL/L (ref 3–18)
AST SERPL-CCNC: 61 U/L (ref 10–38)
BASOPHILS # BLD: 0.1 K/UL (ref 0–0.1)
BASOPHILS NFR BLD: 1 % (ref 0–2)
BILIRUB SERPL-MCNC: 1.1 MG/DL (ref 0.2–1)
BUN SERPL-MCNC: 12 MG/DL (ref 7–18)
BUN/CREAT SERPL: 17 (ref 12–20)
C DIFF GDH STL QL: NEGATIVE
C DIFF TOX A+B STL QL IA: NEGATIVE
C DIFF TOXIN INTERPRETATION: NORMAL
CALCIUM SERPL-MCNC: 8.9 MG/DL (ref 8.5–10.1)
CHLORIDE SERPL-SCNC: 103 MMOL/L (ref 100–111)
CO2 SERPL-SCNC: 24 MMOL/L (ref 21–32)
CREAT SERPL-MCNC: 0.7 MG/DL (ref 0.6–1.3)
DIFFERENTIAL METHOD BLD: ABNORMAL
EOSINOPHIL # BLD: 0 K/UL (ref 0–0.4)
EOSINOPHIL NFR BLD: 0 % (ref 0–5)
ERYTHROCYTE [DISTWIDTH] IN BLOOD BY AUTOMATED COUNT: 13.9 % (ref 11.6–14.5)
GLOBULIN SER CALC-MCNC: 4.9 G/DL (ref 2–4)
GLUCOSE SERPL-MCNC: 110 MG/DL (ref 74–99)
HCT VFR BLD AUTO: 30.3 % (ref 35–45)
HGB BLD-MCNC: 10 G/DL (ref 12–16)
IMM GRANULOCYTES # BLD AUTO: 0.1 K/UL (ref 0–0.04)
IMM GRANULOCYTES NFR BLD AUTO: 1 % (ref 0–0.5)
LYMPHOCYTES # BLD: 1.9 K/UL (ref 0.9–3.6)
LYMPHOCYTES NFR BLD: 21 % (ref 21–52)
MCH RBC QN AUTO: 29.2 PG (ref 24–34)
MCHC RBC AUTO-ENTMCNC: 33 G/DL (ref 31–37)
MCV RBC AUTO: 88.6 FL (ref 78–100)
MONOCYTES # BLD: 1.1 K/UL (ref 0.05–1.2)
MONOCYTES NFR BLD: 12 % (ref 3–10)
NEUTS SEG # BLD: 6.1 K/UL (ref 1.8–8)
NEUTS SEG NFR BLD: 66 % (ref 40–73)
NRBC # BLD: 0 K/UL (ref 0–0.01)
NRBC BLD-RTO: 0 PER 100 WBC
PLATELET # BLD AUTO: 319 K/UL (ref 135–420)
PMV BLD AUTO: 11 FL (ref 9.2–11.8)
POTASSIUM SERPL-SCNC: 2.9 MMOL/L (ref 3.5–5.5)
PROT SERPL-MCNC: 7.3 G/DL (ref 6.4–8.2)
RBC # BLD AUTO: 3.42 M/UL (ref 4.2–5.3)
SODIUM SERPL-SCNC: 135 MMOL/L (ref 136–145)
WBC # BLD AUTO: 9.4 K/UL (ref 4.6–13.2)

## 2024-07-29 PROCEDURE — 1100000000 HC RM PRIVATE

## 2024-07-29 PROCEDURE — 99232 SBSQ HOSP IP/OBS MODERATE 35: CPT | Performed by: STUDENT IN AN ORGANIZED HEALTH CARE EDUCATION/TRAINING PROGRAM

## 2024-07-29 PROCEDURE — 6360000002 HC RX W HCPCS: Performed by: STUDENT IN AN ORGANIZED HEALTH CARE EDUCATION/TRAINING PROGRAM

## 2024-07-29 PROCEDURE — 87070 CULTURE OTHR SPECIMN AEROBIC: CPT

## 2024-07-29 PROCEDURE — 6370000000 HC RX 637 (ALT 250 FOR IP): Performed by: STUDENT IN AN ORGANIZED HEALTH CARE EDUCATION/TRAINING PROGRAM

## 2024-07-29 PROCEDURE — 80053 COMPREHEN METABOLIC PANEL: CPT

## 2024-07-29 PROCEDURE — 71250 CT THORAX DX C-: CPT

## 2024-07-29 PROCEDURE — 6370000000 HC RX 637 (ALT 250 FOR IP): Performed by: INTERNAL MEDICINE

## 2024-07-29 PROCEDURE — 2500000003 HC RX 250 WO HCPCS: Performed by: INTERNAL MEDICINE

## 2024-07-29 PROCEDURE — 2580000003 HC RX 258: Performed by: INTERNAL MEDICINE

## 2024-07-29 PROCEDURE — 97162 PT EVAL MOD COMPLEX 30 MIN: CPT

## 2024-07-29 PROCEDURE — 94640 AIRWAY INHALATION TREATMENT: CPT

## 2024-07-29 PROCEDURE — 87205 SMEAR GRAM STAIN: CPT

## 2024-07-29 PROCEDURE — 85025 COMPLETE CBC W/AUTO DIFF WBC: CPT

## 2024-07-29 PROCEDURE — 6360000002 HC RX W HCPCS: Performed by: INTERNAL MEDICINE

## 2024-07-29 PROCEDURE — 36415 COLL VENOUS BLD VENIPUNCTURE: CPT

## 2024-07-29 RX ORDER — SODIUM CHLORIDE AND POTASSIUM CHLORIDE 300; 900 MG/100ML; MG/100ML
INJECTION, SOLUTION INTRAVENOUS CONTINUOUS
Status: DISCONTINUED | OUTPATIENT
Start: 2024-07-29 | End: 2024-08-01 | Stop reason: HOSPADM

## 2024-07-29 RX ORDER — IPRATROPIUM BROMIDE AND ALBUTEROL SULFATE 2.5; .5 MG/3ML; MG/3ML
1 SOLUTION RESPIRATORY (INHALATION)
Status: DISCONTINUED | OUTPATIENT
Start: 2024-07-29 | End: 2024-07-30

## 2024-07-29 RX ORDER — POTASSIUM CHLORIDE 20 MEQ/1
40 TABLET, EXTENDED RELEASE ORAL ONCE
Status: COMPLETED | OUTPATIENT
Start: 2024-07-29 | End: 2024-07-29

## 2024-07-29 RX ADMIN — POTASSIUM CHLORIDE AND SODIUM CHLORIDE: 900; 300 INJECTION, SOLUTION INTRAVENOUS at 10:47

## 2024-07-29 RX ADMIN — SODIUM CHLORIDE, PRESERVATIVE FREE 10 ML: 5 INJECTION INTRAVENOUS at 10:38

## 2024-07-29 RX ADMIN — IPRATROPIUM BROMIDE AND ALBUTEROL SULFATE 1 DOSE: .5; 3 SOLUTION RESPIRATORY (INHALATION) at 20:47

## 2024-07-29 RX ADMIN — WATER 1000 MG: 1 INJECTION INTRAMUSCULAR; INTRAVENOUS; SUBCUTANEOUS at 10:30

## 2024-07-29 RX ADMIN — GUAIFENESIN 200 MG: 200 SOLUTION ORAL at 16:03

## 2024-07-29 RX ADMIN — POTASSIUM CHLORIDE 40 MEQ: 1500 TABLET, EXTENDED RELEASE ORAL at 10:36

## 2024-07-29 RX ADMIN — SODIUM CHLORIDE, PRESERVATIVE FREE 10 ML: 5 INJECTION INTRAVENOUS at 21:28

## 2024-07-29 RX ADMIN — GUAIFENESIN 200 MG: 200 SOLUTION ORAL at 10:37

## 2024-07-29 RX ADMIN — ENOXAPARIN SODIUM 40 MG: 100 INJECTION SUBCUTANEOUS at 10:29

## 2024-07-29 RX ADMIN — ACETAMINOPHEN 325MG 650 MG: 325 TABLET ORAL at 02:17

## 2024-07-29 RX ADMIN — AZITHROMYCIN MONOHYDRATE 500 MG: 500 INJECTION, POWDER, LYOPHILIZED, FOR SOLUTION INTRAVENOUS at 10:48

## 2024-07-29 RX ADMIN — ACETAMINOPHEN 325MG 650 MG: 325 TABLET ORAL at 16:02

## 2024-07-29 RX ADMIN — GUAIFENESIN 200 MG: 200 SOLUTION ORAL at 21:28

## 2024-07-29 ASSESSMENT — PAIN SCALES - GENERAL
PAINLEVEL_OUTOF10: 0

## 2024-07-29 NOTE — PLAN OF CARE
Problem: Pain  Goal: Verbalizes/displays adequate comfort level or baseline comfort level  Outcome: Progressing     Problem: Respiratory - Adult  Goal: Achieves optimal ventilation and oxygenation  Outcome: Progressing  Flowsheets (Taken 7/29/2024 0900)  Achieves optimal ventilation and oxygenation:   Assess for changes in respiratory status   Assess for changes in mentation and behavior   Position to facilitate oxygenation and minimize respiratory effort     Problem: Discharge Planning  Goal: Discharge to home or other facility with appropriate resources  Outcome: Progressing  Flowsheets (Taken 7/29/2024 0900)  Discharge to home or other facility with appropriate resources:   Identify barriers to discharge with patient and caregiver   Arrange for needed discharge resources and transportation as appropriate     Problem: Metabolic/Fluid and Electrolytes - Adult  Goal: Electrolytes maintained within normal limits  Outcome: Progressing  Flowsheets (Taken 7/29/2024 0900)  Electrolytes maintained within normal limits:   Monitor labs and assess patient for signs and symptoms of electrolyte imbalances   Administer electrolyte replacement as ordered  Goal: Hemodynamic stability and optimal renal function maintained  Outcome: Progressing  Flowsheets (Taken 7/29/2024 0900)  Hemodynamic stability and optimal renal function maintained:   Monitor labs and assess for signs and symptoms of volume excess or deficit   Monitor intake, output and patient weight  Goal: Glucose maintained within prescribed range  Outcome: Progressing  Flowsheets (Taken 7/29/2024 0900)  Glucose maintained within prescribed range:   Monitor blood glucose as ordered   Assess for signs and symptoms of hyperglycemia and hypoglycemia     Problem: Safety - Adult  Goal: Free from fall injury  Outcome: Progressing  Flowsheets (Taken 7/29/2024 1057)  Free From Fall Injury: Instruct family/caregiver on patient safety

## 2024-07-29 NOTE — PROGRESS NOTES
[facility] Hospitalist Group  Progress Note    Patient: Diana Gastelum Age: 63 y.o. : 1960 MR#: [id number] SSN: [id number]  Date: 2024                 DVT Prophylaxis:  [x]Lovenox  []Hep SQ  []SCDs  []Coumadin   []On Heparin gtt []PO anticoagulant    Anticipated discharge: 2024 to home    Subjective:     The patient was seen and examined at the bedside.  She said that she feels okay.  She said that she had a rough night yesterday.  She still feels dyspneic, and occasionally lightheaded during ambulation.  She still has a dry cough.  She denied any chest pain.    Objective:   VS: /76   Pulse (!) 112   Temp 99.5 °F (37.5 °C) (Oral)   Resp 18   Ht 1.626 m (5' 4\")   Wt 65.8 kg (145 lb)   SpO2 94%   PF 98 L/min   BMI 24.89 kg/m²    Tmax/24hrs: Temp (24hrs), Av.8 °F (37.7 °C), Min:98.3 °F (36.8 °C), Max:102.8 °F (39.3 °C)    Intake/Output Summary (Last 24 hours) at 2024 1257  Last data filed at 2024 1050  Gross per 24 hour   Intake 254.48 ml   Output --   Net 254.48 ml        PHYSICAL EXAM  General Appearance: Looks tired  HENT: normocephalic/atraumatic, moist mucus membranes  Neck: No JVD, supple  Lungs:  inspiratory crackles auscultated over the left lung  CV: RRR, no m/r/g  Abdomen: soft, non-tender, normal bowel sounds  Extremities: no cyanosis, no peripheral edema  Neuro: No focal deficits, motor/sensory intact  Skin: Normal color, intact  Psych: appropriate affect, alert and oriented to person, place and time    Current Facility-Administered Medications   Medication Dose Route Frequency    0.9% NaCl with KCl 40 mEq infusion   IntraVENous Continuous    guaiFENesin (ROBITUSSIN) 100 MG/5ML liquid 200 mg  200 mg Oral Q4H PRN    ibuprofen (ADVIL;MOTRIN) tablet 400 mg  400 mg Oral Q6H PRN    ipratropium 0.5 mg-albuterol 2.5 mg (DUONEB) nebulizer solution 1 Dose  1 Dose Inhalation Q4H PRN    sodium chloride flush 0.9 % injection 5-40 mL   (H) 74 - 99 mg/dL    BUN 12 7.0 - 18 MG/DL    Creatinine 0.70 0.6 - 1.3 MG/DL    BUN/Creatinine Ratio 17 12 - 20      Est, Glom Filt Rate >90 >60 ml/min/1.73m2    Calcium 8.9 8.5 - 10.1 MG/DL    Total Bilirubin 1.1 (H) 0.2 - 1.0 MG/DL    ALT 41 13 - 56 U/L    AST 61 (H) 10 - 38 U/L    Alk Phosphatase 98 45 - 117 U/L    Total Protein 7.3 6.4 - 8.2 g/dL    Albumin 2.4 (L) 3.4 - 5.0 g/dL    Globulin 4.9 (H) 2.0 - 4.0 g/dL    Albumin/Globulin Ratio 0.5 (L) 0.8 - 1.7         Imaging:  XR CHEST PORTABLE    Result Date: 7/27/2024  AP CHEST, PORTABLE INDICATION: Above. Cough, shortness of breath worse with exertion. Diarrhea, headache. COVID rule out. Sepsis. COMPARISON: No priors available at this time, EMR notes possible multiple records marked for merge. TECHNIQUE: Portable AP chest radiograph is reviewed. FINDINGS: Metallic ring shaped densities overlie the patient bilaterally likely related to clothing such as brassiere. Fairly extensive region of increased lung opacity centered peripherally laterally in the left midlung consistent with airspace disease. Mild blunting of the left costophrenic sulcus consistent with small pleural effusion. Nonspecific pattern, concerning for pneumonia which could be due to COVID versus other etiologies such as bacterial pneumonia with small parapneumonic effusion, clinical correlation is needed. No evidence of pneumothorax.  The right costophrenic sulcus appears sharp.  The cardiac silhouette is within normal limits in size.     Fairly extensive region of airspace disease/infiltrate centered in the left midlung laterally as described. Small left pleural effusion. Electronically signed by Juancho Montes De Oca      Assessment/Plan:     Sepsis  On admission, the patient had 4 out of 4 SIRS criteria [fever, HR> 90 bpm,  tachypnea and leukocytosis].  Sepsis was present on admission and was secondary to community-acquired pneumonia involving the left lung.  CT scan of the thorax without contrast

## 2024-07-29 NOTE — PLAN OF CARE
Problem: Physical Therapy - Adult  Goal: By Discharge: Performs mobility at highest level of function for planned discharge setting.  See evaluation for individualized goals.  Description: Physical Therapy Goals:  Initiated 7/29/2024 to be met within 7-10 days.    1.  Patient will move from supine to sit and sit to supine  in bed with independence.    2.  Patient will transfer from bed to chair and chair to bed with independence.  3.  Patient will perform sit to stand with independence.  4.  Patient will ambulate with independence for 150 feet.     PLOF: independent with mobility without an assistive device     Outcome: Progressing   PHYSICAL THERAPY EVALUATION    Patient: Diana Gastelum (63 y.o. female)  Date: 7/29/2024  Primary Diagnosis: Septicemia (HCC) [A41.9]  Sepsis (HCC) [A41.9]  Pneumonia of left lower lobe due to infectious organism [J18.9]       Precautions: Fall Risk, Contact Precautions (enteric)    ASSESSMENT :  Pt is in bed and agreeable to PT evaluation.  Pt was mod I with bed mobility and demonstrated good static sitting balance.  Pt performed standing transfers with supervision.  Pt ambulated 30 feet with supervision initially and required CGA as she fatigued.  Pt was SOB after ambulation and O2 sat was noted to be 90% and increased to 95% with several minutes of sitting edge of bed.  Pt was instructed to call for assistance with mobility at this time. Pt was left sitting edge of bed with needs in reach and nursing notified of O2 sats.    DEFICITS/IMPAIRMENTS:    , Body Structures, Functions, Activity Limitations Requiring Skilled Therapeutic Intervention: Decreased functional mobility ;Decreased tolerance to work activity;Decreased balance;Decreased endurance    Patient will benefit from skilled intervention to address the above impairments.  Patient's rehabilitation potential/Therapy Prognosis: Good.  Factors which may influence rehabilitation potential include:   []         None

## 2024-07-30 LAB
ANION GAP SERPL CALC-SCNC: 6 MMOL/L (ref 3–18)
BASOPHILS # BLD: 0.1 K/UL (ref 0–0.1)
BASOPHILS NFR BLD: 1 % (ref 0–2)
BUN SERPL-MCNC: 8 MG/DL (ref 7–18)
BUN/CREAT SERPL: 16 (ref 12–20)
CALCIUM SERPL-MCNC: 8.4 MG/DL (ref 8.5–10.1)
CHLORIDE SERPL-SCNC: 107 MMOL/L (ref 100–111)
CO2 SERPL-SCNC: 24 MMOL/L (ref 21–32)
CREAT SERPL-MCNC: 0.51 MG/DL (ref 0.6–1.3)
DIFFERENTIAL METHOD BLD: ABNORMAL
EOSINOPHIL # BLD: 0.2 K/UL (ref 0–0.4)
EOSINOPHIL NFR BLD: 2 % (ref 0–5)
ERYTHROCYTE [DISTWIDTH] IN BLOOD BY AUTOMATED COUNT: 13.9 % (ref 11.6–14.5)
GLUCOSE SERPL-MCNC: 109 MG/DL (ref 74–99)
HCT VFR BLD AUTO: 24.4 % (ref 35–45)
HGB BLD-MCNC: 8.1 G/DL (ref 12–16)
IMM GRANULOCYTES # BLD AUTO: 0.1 K/UL (ref 0–0.04)
IMM GRANULOCYTES NFR BLD AUTO: 1 % (ref 0–0.5)
LYMPHOCYTES # BLD: 1.7 K/UL (ref 0.9–3.6)
LYMPHOCYTES NFR BLD: 18 % (ref 21–52)
MCH RBC QN AUTO: 29.5 PG (ref 24–34)
MCHC RBC AUTO-ENTMCNC: 33.2 G/DL (ref 31–37)
MCV RBC AUTO: 88.7 FL (ref 78–100)
MONOCYTES # BLD: 1.1 K/UL (ref 0.05–1.2)
MONOCYTES NFR BLD: 12 % (ref 3–10)
NEUTS SEG # BLD: 6.5 K/UL (ref 1.8–8)
NEUTS SEG NFR BLD: 67 % (ref 40–73)
NRBC # BLD: 0 K/UL (ref 0–0.01)
NRBC BLD-RTO: 0 PER 100 WBC
PLATELET # BLD AUTO: 321 K/UL (ref 135–420)
PMV BLD AUTO: 10.3 FL (ref 9.2–11.8)
POTASSIUM SERPL-SCNC: 3.3 MMOL/L (ref 3.5–5.5)
PROCALCITONIN SERPL-MCNC: 0.49 NG/ML
RBC # BLD AUTO: 2.75 M/UL (ref 4.2–5.3)
SODIUM SERPL-SCNC: 137 MMOL/L (ref 136–145)
WBC # BLD AUTO: 9.6 K/UL (ref 4.6–13.2)

## 2024-07-30 PROCEDURE — 84145 PROCALCITONIN (PCT): CPT

## 2024-07-30 PROCEDURE — 6360000002 HC RX W HCPCS: Performed by: INTERNAL MEDICINE

## 2024-07-30 PROCEDURE — 2580000003 HC RX 258: Performed by: INTERNAL MEDICINE

## 2024-07-30 PROCEDURE — 6370000000 HC RX 637 (ALT 250 FOR IP): Performed by: INTERNAL MEDICINE

## 2024-07-30 PROCEDURE — 1100000000 HC RM PRIVATE

## 2024-07-30 PROCEDURE — 94761 N-INVAS EAR/PLS OXIMETRY MLT: CPT

## 2024-07-30 PROCEDURE — 2500000003 HC RX 250 WO HCPCS: Performed by: INTERNAL MEDICINE

## 2024-07-30 PROCEDURE — 36415 COLL VENOUS BLD VENIPUNCTURE: CPT

## 2024-07-30 PROCEDURE — 6370000000 HC RX 637 (ALT 250 FOR IP): Performed by: STUDENT IN AN ORGANIZED HEALTH CARE EDUCATION/TRAINING PROGRAM

## 2024-07-30 PROCEDURE — 6360000002 HC RX W HCPCS: Performed by: STUDENT IN AN ORGANIZED HEALTH CARE EDUCATION/TRAINING PROGRAM

## 2024-07-30 PROCEDURE — 85025 COMPLETE CBC W/AUTO DIFF WBC: CPT

## 2024-07-30 PROCEDURE — 80048 BASIC METABOLIC PNL TOTAL CA: CPT

## 2024-07-30 PROCEDURE — 99232 SBSQ HOSP IP/OBS MODERATE 35: CPT | Performed by: STUDENT IN AN ORGANIZED HEALTH CARE EDUCATION/TRAINING PROGRAM

## 2024-07-30 RX ORDER — IPRATROPIUM BROMIDE AND ALBUTEROL SULFATE 2.5; .5 MG/3ML; MG/3ML
1 SOLUTION RESPIRATORY (INHALATION) EVERY 4 HOURS PRN
Status: DISCONTINUED | OUTPATIENT
Start: 2024-07-30 | End: 2024-08-01 | Stop reason: HOSPADM

## 2024-07-30 RX ORDER — GUAIFENESIN/DEXTROMETHORPHAN 100-10MG/5
5 SYRUP ORAL EVERY 6 HOURS
Status: DISCONTINUED | OUTPATIENT
Start: 2024-07-30 | End: 2024-08-01 | Stop reason: HOSPADM

## 2024-07-30 RX ADMIN — POTASSIUM CHLORIDE AND SODIUM CHLORIDE: 900; 300 INJECTION, SOLUTION INTRAVENOUS at 17:21

## 2024-07-30 RX ADMIN — ACETAMINOPHEN 325MG 650 MG: 325 TABLET ORAL at 17:21

## 2024-07-30 RX ADMIN — IPRATROPIUM BROMIDE AND ALBUTEROL SULFATE 1 DOSE: .5; 3 SOLUTION RESPIRATORY (INHALATION) at 01:47

## 2024-07-30 RX ADMIN — POTASSIUM CHLORIDE AND SODIUM CHLORIDE: 900; 300 INJECTION, SOLUTION INTRAVENOUS at 00:26

## 2024-07-30 RX ADMIN — GUAIFENESIN AND DEXTROMETHORPHAN 5 ML: 100; 10 SYRUP ORAL at 22:40

## 2024-07-30 RX ADMIN — ENOXAPARIN SODIUM 40 MG: 100 INJECTION SUBCUTANEOUS at 10:31

## 2024-07-30 RX ADMIN — GUAIFENESIN 200 MG: 200 SOLUTION ORAL at 16:03

## 2024-07-30 RX ADMIN — CEFTRIAXONE SODIUM 2000 MG: 2 INJECTION, POWDER, FOR SOLUTION INTRAMUSCULAR; INTRAVENOUS at 22:40

## 2024-07-30 RX ADMIN — GUAIFENESIN 200 MG: 200 SOLUTION ORAL at 10:32

## 2024-07-30 RX ADMIN — GUAIFENESIN 200 MG: 200 SOLUTION ORAL at 01:47

## 2024-07-30 RX ADMIN — SODIUM CHLORIDE, PRESERVATIVE FREE 10 ML: 5 INJECTION INTRAVENOUS at 10:51

## 2024-07-30 RX ADMIN — WATER 1000 MG: 1 INJECTION INTRAMUSCULAR; INTRAVENOUS; SUBCUTANEOUS at 10:50

## 2024-07-30 RX ADMIN — AZITHROMYCIN MONOHYDRATE 500 MG: 500 INJECTION, POWDER, LYOPHILIZED, FOR SOLUTION INTRAVENOUS at 10:47

## 2024-07-30 ASSESSMENT — PAIN SCALES - GENERAL
PAINLEVEL_OUTOF10: 0
PAINLEVEL_OUTOF10: 4
PAINLEVEL_OUTOF10: 0
PAINLEVEL_OUTOF10: 7

## 2024-07-30 ASSESSMENT — PAIN DESCRIPTION - DESCRIPTORS: DESCRIPTORS: ACHING

## 2024-07-30 ASSESSMENT — PAIN DESCRIPTION - LOCATION: LOCATION: HEAD

## 2024-07-30 ASSESSMENT — PAIN DESCRIPTION - ORIENTATION: ORIENTATION: ANTERIOR;LEFT;RIGHT

## 2024-07-30 NOTE — PLAN OF CARE
Problem: Pain  Goal: Verbalizes/displays adequate comfort level or baseline comfort level  Outcome: Progressing  Flowsheets  Taken 7/30/2024 1200  Verbalizes/displays adequate comfort level or baseline comfort level:   Encourage patient to monitor pain and request assistance   Assess pain using appropriate pain scale   Administer analgesics based on type and severity of pain and evaluate response  Taken 7/30/2024 1047  Verbalizes/displays adequate comfort level or baseline comfort level:   Encourage patient to monitor pain and request assistance   Assess pain using appropriate pain scale     Problem: Respiratory - Adult  Goal: Achieves optimal ventilation and oxygenation  Outcome: Progressing  Flowsheets (Taken 7/30/2024 1047)  Achieves optimal ventilation and oxygenation:   Assess for changes in respiratory status   Assess for changes in mentation and behavior   Position to facilitate oxygenation and minimize respiratory effort     Problem: Discharge Planning  Goal: Discharge to home or other facility with appropriate resources  Outcome: Progressing  Flowsheets (Taken 7/30/2024 1047)  Discharge to home or other facility with appropriate resources: Identify discharge learning needs (meds, wound care, etc)     Problem: Metabolic/Fluid and Electrolytes - Adult  Goal: Electrolytes maintained within normal limits  Outcome: Progressing  Flowsheets (Taken 7/30/2024 1047)  Electrolytes maintained within normal limits: Monitor labs and assess patient for signs and symptoms of electrolyte imbalances  Goal: Hemodynamic stability and optimal renal function maintained  Outcome: Progressing  Flowsheets (Taken 7/30/2024 1047)  Hemodynamic stability and optimal renal function maintained: Monitor intake, output and patient weight  Goal: Glucose maintained within prescribed range  Outcome: Progressing  Flowsheets (Taken 7/30/2024 1047)  Glucose maintained within prescribed range: Monitor blood glucose as ordered     Problem:  Safety - Adult  Goal: Free from fall injury  Outcome: Progressing

## 2024-07-30 NOTE — PROGRESS NOTES
[facility] Hospitalist [facility]  Progress Note    Patient: Diana Gastelum Age: 63 y.o. : 1960 MR#: [id number] SSN: [id number]  Date: 2024                 DVT Prophylaxis:  [x]Lovenox  []Hep SQ  []SCDs  []Coumadin   []On Heparin gtt []PO anticoagulant    Anticipated discharge: 2024 or 2024 to home, pending clinical course and resolution of fever    Subjective:     The patient was seen and examined at the bedside.  Her sister was also in the room.  Patient said that she feels slightly better than yesterday but still appears visibly distressed during the encounter.  She was unable to speak in full sentences.  She still has a dry cough.    Objective:   VS: /77   Pulse 99   Temp 99.6 °F (37.6 °C) (Oral)   Resp 20   Ht 1.626 m (5' 4\")   Wt 65.8 kg (145 lb)   SpO2 95%   PF 98 L/min   BMI 24.89 kg/m²    Tmax/24hrs: Temp (24hrs), Av.9 °F (37.2 °C), Min:98.4 °F (36.9 °C), Max:99.6 °F (37.6 °C)    Intake/Output Summary (Last 24 hours) at 2024 1704  Last data filed at 2024 1051  Gross per 24 hour   Intake 1414.84 ml   Output --   Net 1414.84 ml        PHYSICAL EXAM  General Appearance: Looks tired  HENT: normocephalic/atraumatic, moist mucus membranes  Neck: No JVD, supple  Lungs: Faint inspiratory crackles auscultated over the left lung  CV: RRR, no m/r/g  Abdomen: soft, non-tender, normal bowel sounds  Extremities: no cyanosis, no peripheral edema  Neuro: No focal deficits, motor/sensory intact  Skin: Normal color, intact  Psych: appropriate affect, alert and oriented to person, place and time    Current Facility-Administered Medications   Medication Dose Route Frequency    ipratropium 0.5 mg-albuterol 2.5 mg (DUONEB) nebulizer solution 1 Dose  1 Dose Inhalation Q4H PRN    cefTRIAXone (ROCEPHIN) 2,000 mg in sterile water 20 mL IV syringe  2,000 mg IntraVENous Q24H    guaiFENesin-dextromethorphan (ROBITUSSIN DM) 100-10 MG/5ML syrup 5 mL   plan were also discussed with the patient/patient's family and with RN.  Greater than 50% of the time was spent on direct patient care.       Signed By: [unfilled]     July 30, 2024 5:04 PM

## 2024-07-30 NOTE — CARE COORDINATION
07/30/24 0937   Service Assessment   Patient Orientation Alert and Oriented;Person;Place;Situation;Self   Cognition Alert   History Provided By Patient   Primary Caregiver Self   Accompanied By/Relationship Patient is currently alone in room   Support Systems Family Members;Friends/Neighbors   Patient's Healthcare Decision Maker is:   (N/A)   PCP Verified by CM No   Prior Functional Level Independent in ADLs/IADLs   Current Functional Level Independent in ADLs/IADLs   Can patient return to prior living arrangement Yes   Ability to make needs known: Good   Family able to assist with home care needs: Yes   Would you like for me to discuss the discharge plan with any other family members/significant others, and if so, who? Yes   Financial Resources Other (Comment)  ([employer] [facility])   Community Resources None   CM/SW Referral No PCP;Other (see comment)  (Discharge Planning)   Social/Functional History   Lives With Alone   Type of Home House   Home Layout Two level   Home Access Level entry   Bathroom Shower/Tub Tub/Shower unit   Bathroom Toilet Standard   Bathroom Equipment None   Bathroom Accessibility Accessible   Home Equipment None   Receives Help From Family;Neighbor   ADL Assistance Independent   Homemaking Assistance Independent   Homemaking Responsibilities Yes   Ambulation Assistance Independent   Transfer Assistance Independent   Active  Yes   Mode of Transportation Car   Education Not Applicable   Occupation Full time employment   Type of Occupation [employer]   Discharge Planning   Type of Residence House   Living Arrangements Alone   Current Services Prior To Admission None   Potential Assistance Needed N/A   DME Ordered? No   Potential Assistance Purchasing Medications No   Type of Home Care Services None   Patient expects to be discharged to: House   Follow Up Appointment: Best Day/Time    (TBD)   One/Two Story Residence One story   # of Interior Steps 12   History of falls? 0

## 2024-07-30 NOTE — PROGRESS NOTES
Advance Care Planning     Advance Care Planning Inpatient Note  Spiritual Care Department    Today's Date: 7/30/2024  Unit: [facility]    Received request from admission screening.  Upon review of chart and communication with care team, patient's decision making abilities are not in question.. Patient was/were present in the room during visit.    Goals of ACP Conversation:  Discuss advance care planning documents    Health Care Decision Makers:     No healthcare decision makers have been documented.  Click here to complete HealthCare Decision Makers including selection of the Healthcare Decision Maker Relationship (ie \"Primary\")  Summary:  Documented Next of Kin, per patient report      Kristen Zapata Niece/Nephew    Primary Phone: [phone] (M)Mobile Phone: [phone]        Advance Care Planning Documents (Patient Wishes):  None     Assessment:  Patient is alert and coping well. No spiritual concerns.Patient wanted more information concerning an Advance Medical Directive. I answered all her questions concerning the form. Patient declined to complete an Advance Medical Directive at this time.     Interventions:  Provided education on documents for clarity and greater understanding  Reviewed but did not complete ACP document    Care Preferences Communicated:   No    Outcomes/Plan:  ACP Discussion: Refused    Electronically signed by TYSON Kerns on 7/30/2024 at 11:35 AM

## 2024-07-30 NOTE — CONSULTS
approximately four days.  Patient reports that she has not taken any antibiotics recently.  Patient reports that she takes no medications at home and has no Home Pharmacy.  Patient Smokes 0.25-0.3 PPD x2 months and started Smoking due to the recently loss of her  who passed away.  In [facility] ER, Patient is noted to have Temperature 101.0°F, Heart Rate 117 bpm, Respiration Rate 22 bpm, Blood Pressure 100/66 mm Hg to 134/80 mm Hg (believe DBP of 107 mm Hg was erroneous), SpO2 93% on Room Air, WBC 13.8 K/uL, Hgb 11.0 g/dL, Neut% 82%, Neut# 11.3, UA shows small hematuria, Lactic Acid 1.48 mmol/L, Na+ 128 mmol/L, K+ 3.5 mmol/L, Cl- 95 mmol/L, BUN 19 mg/dL, Creatinine 0.86 mg/dL (Unknown), eGFR 77, Glucose 116 mg/dL, Albumin 2.7 g/dL, ALT 58 U/L, AST 94 U/L, Total Bilirubin 1.9 mg/dL, Troponin 13 ng/L, Pro- pg/mL, Procalcitonin 2.66 ng/mL, TSH 1.01 uIU/mL, Influenza A/B (-), and SARS-CoV-2 (-).  CXR revealed left lung infiltrate.  CT chest \"7.8 cm consolidation with adjacent airspace/interstitial opacities, small left pleural effusion.\"  Patient received PO Acetaminophen 1,000 mg, IV Ceftriaxone 1,000 mg, IV Azithromycin 500 mg, PO Ibuprofen 600 mg.  Patient continued to have persistent fevers with Tmax 102.8 yesterday.  Sputum culture sent.  Patient had fever with Tmax 101.3 yesterday.  I have been consulted for further recommendations.    Patient states that she feels better today compared to day of admission.  She has persistent headache which worsens when she coughs.  Has cough with yellowish sputum.  Has some pleuritic left-sided chest pain.  Denies photophobia.  No recent outpatient antibiotic use.  No history of frequent pneumonia/infection.  States that she had diarrhea on admission which is currently resolved.         Past Medical History:   Diagnosis Date    Bilateral hearing loss     Tobacco abuse 05/2024    0.25-0.3 PPD x2 months (as of 7/28/2024).       Past Surgical History:   Procedure Laterality  detected        Rhinovirus Enterovirus PCR Not detected        Influenza A by PCR Not detected        Influenza B PCR Not detected        Parainfluenza 1 PCR Not detected        Parainfluenza 2 PCR Not detected        Parainfluenza 3 PCR Not detected        Parainfluenza 4 PCR Not detected        Respiratory Syncytial Virus by PCR Not detected        Bordetella parapertussis by PCR Not detected        Bordetella pertussis by PCR Not detected        Chlamydophila Pneumonia PCR Not detected        Mycoplasma pneumo by PCR Not detected       Legionella antigen, urine [[id number]]     Order Status: Sent Specimen: Urine     Strep Pneumoniae Antigen [[id number]]     Order Status: Sent Specimen: Urine, clean catch     Clostridium Difficile Toxin/Antigen [[id number]] Collected: 07/28/24 1424    Order Status: Completed Specimen: Stool Updated: 07/29/24 1211     GDH Antigen Negative        C difficile Toxin, EIA Negative        C Diff Toxin Interpretation       NEGATIVE FOR TOXIGENIC C. DIFFICILE          Blood Culture 2 [[id number]] Collected: 07/27/24 0949    Order Status: Completed Specimen: Blood Updated: 07/30/24 0729     Special Requests NO SPECIAL REQUESTS        Culture NO GROWTH 3 DAYS       COVID-19, Rapid [[id number]] Collected: 07/27/24 0942    Order Status: Completed Specimen: Nasopharyngeal Updated: 07/27/24 1021     Source Nasopharyngeal        SARS-CoV-2, Rapid Not detected        Comment: Rapid Abbott ID Now     Rapid NAAT:  The specimen is NEGATIVE for SARS-CoV-2, the novel coronavirus associated with COVID-19.     Negative results should be treated as presumptive and, if inconsistent with clinical signs and symptoms or necessary for patient management, should be tested with an alternative molecular assay.  Negative results do not preclude SARS-CoV-2 infection and should not be used as the sole basis for patient management decisions.     This test has been authorized by the FDA under an Emergency Use

## 2024-07-31 LAB
ANION GAP SERPL CALC-SCNC: 5 MMOL/L (ref 3–18)
BACTERIA SPEC CULT: NORMAL
BASOPHILS # BLD: 0.1 K/UL (ref 0–0.1)
BASOPHILS NFR BLD: 1 % (ref 0–2)
BUN SERPL-MCNC: 6 MG/DL (ref 7–18)
BUN/CREAT SERPL: 13 (ref 12–20)
CALCIUM SERPL-MCNC: 8.7 MG/DL (ref 8.5–10.1)
CHLORIDE SERPL-SCNC: 111 MMOL/L (ref 100–111)
CO2 SERPL-SCNC: 23 MMOL/L (ref 21–32)
CREAT SERPL-MCNC: 0.48 MG/DL (ref 0.6–1.3)
DIFFERENTIAL METHOD BLD: ABNORMAL
EOSINOPHIL # BLD: 0.4 K/UL (ref 0–0.4)
EOSINOPHIL NFR BLD: 4 % (ref 0–5)
ERYTHROCYTE [DISTWIDTH] IN BLOOD BY AUTOMATED COUNT: 14.1 % (ref 11.6–14.5)
GLUCOSE SERPL-MCNC: 110 MG/DL (ref 74–99)
GRAM STN SPEC: NORMAL
HCT VFR BLD AUTO: 23.8 % (ref 35–45)
HCT VFR BLD AUTO: 27.5 % (ref 35–45)
HGB BLD-MCNC: 7.7 G/DL (ref 12–16)
HGB BLD-MCNC: 9.1 G/DL (ref 12–16)
IMM GRANULOCYTES # BLD AUTO: 0.2 K/UL (ref 0–0.04)
IMM GRANULOCYTES NFR BLD AUTO: 2 % (ref 0–0.5)
LYMPHOCYTES # BLD: 2 K/UL (ref 0.9–3.6)
LYMPHOCYTES NFR BLD: 21 % (ref 21–52)
MCH RBC QN AUTO: 29.1 PG (ref 24–34)
MCHC RBC AUTO-ENTMCNC: 32.4 G/DL (ref 31–37)
MCV RBC AUTO: 89.8 FL (ref 78–100)
MONOCYTES # BLD: 1 K/UL (ref 0.05–1.2)
MONOCYTES NFR BLD: 10 % (ref 3–10)
NEUTS SEG # BLD: 6 K/UL (ref 1.8–8)
NEUTS SEG NFR BLD: 63 % (ref 40–73)
NRBC # BLD: 0 K/UL (ref 0–0.01)
NRBC BLD-RTO: 0 PER 100 WBC
PLATELET # BLD AUTO: 397 K/UL (ref 135–420)
PMV BLD AUTO: 10.2 FL (ref 9.2–11.8)
POTASSIUM SERPL-SCNC: 3.9 MMOL/L (ref 3.5–5.5)
RBC # BLD AUTO: 2.65 M/UL (ref 4.2–5.3)
SERVICE CMNT-IMP: NORMAL
SERVICE CMNT-IMP: NORMAL
SODIUM SERPL-SCNC: 139 MMOL/L (ref 136–145)
WBC # BLD AUTO: 9.6 K/UL (ref 4.6–13.2)

## 2024-07-31 PROCEDURE — 6360000002 HC RX W HCPCS: Performed by: STUDENT IN AN ORGANIZED HEALTH CARE EDUCATION/TRAINING PROGRAM

## 2024-07-31 PROCEDURE — 6370000000 HC RX 637 (ALT 250 FOR IP): Performed by: INTERNAL MEDICINE

## 2024-07-31 PROCEDURE — 99232 SBSQ HOSP IP/OBS MODERATE 35: CPT | Performed by: INTERNAL MEDICINE

## 2024-07-31 PROCEDURE — 85025 COMPLETE CBC W/AUTO DIFF WBC: CPT

## 2024-07-31 PROCEDURE — 2580000003 HC RX 258: Performed by: INTERNAL MEDICINE

## 2024-07-31 PROCEDURE — 97110 THERAPEUTIC EXERCISES: CPT

## 2024-07-31 PROCEDURE — 6360000002 HC RX W HCPCS: Performed by: INTERNAL MEDICINE

## 2024-07-31 PROCEDURE — 94761 N-INVAS EAR/PLS OXIMETRY MLT: CPT

## 2024-07-31 PROCEDURE — 85018 HEMOGLOBIN: CPT

## 2024-07-31 PROCEDURE — 36415 COLL VENOUS BLD VENIPUNCTURE: CPT

## 2024-07-31 PROCEDURE — 1100000000 HC RM PRIVATE

## 2024-07-31 PROCEDURE — 97116 GAIT TRAINING THERAPY: CPT

## 2024-07-31 PROCEDURE — 85014 HEMATOCRIT: CPT

## 2024-07-31 PROCEDURE — 80048 BASIC METABOLIC PNL TOTAL CA: CPT

## 2024-07-31 PROCEDURE — 82272 OCCULT BLD FECES 1-3 TESTS: CPT

## 2024-07-31 RX ORDER — LORAZEPAM 0.5 MG/1
0.5 TABLET ORAL NIGHTLY PRN
Status: DISCONTINUED | OUTPATIENT
Start: 2024-07-31 | End: 2024-08-01 | Stop reason: HOSPADM

## 2024-07-31 RX ADMIN — CEFTRIAXONE SODIUM 2000 MG: 2 INJECTION, POWDER, FOR SOLUTION INTRAMUSCULAR; INTRAVENOUS at 21:40

## 2024-07-31 RX ADMIN — SODIUM CHLORIDE, PRESERVATIVE FREE 10 ML: 5 INJECTION INTRAVENOUS at 09:55

## 2024-07-31 RX ADMIN — GUAIFENESIN AND DEXTROMETHORPHAN 5 ML: 100; 10 SYRUP ORAL at 17:22

## 2024-07-31 RX ADMIN — GUAIFENESIN AND DEXTROMETHORPHAN 5 ML: 100; 10 SYRUP ORAL at 07:07

## 2024-07-31 RX ADMIN — POTASSIUM CHLORIDE AND SODIUM CHLORIDE: 900; 300 INJECTION, SOLUTION INTRAVENOUS at 17:21

## 2024-07-31 RX ADMIN — SODIUM CHLORIDE, PRESERVATIVE FREE 10 ML: 5 INJECTION INTRAVENOUS at 22:18

## 2024-07-31 RX ADMIN — GUAIFENESIN AND DEXTROMETHORPHAN 5 ML: 100; 10 SYRUP ORAL at 23:18

## 2024-07-31 RX ADMIN — POTASSIUM CHLORIDE AND SODIUM CHLORIDE: 900; 300 INJECTION, SOLUTION INTRAVENOUS at 04:04

## 2024-07-31 RX ADMIN — AZITHROMYCIN MONOHYDRATE 500 MG: 500 INJECTION, POWDER, LYOPHILIZED, FOR SOLUTION INTRAVENOUS at 09:00

## 2024-07-31 RX ADMIN — ENOXAPARIN SODIUM 40 MG: 100 INJECTION SUBCUTANEOUS at 09:00

## 2024-07-31 RX ADMIN — GUAIFENESIN AND DEXTROMETHORPHAN 5 ML: 100; 10 SYRUP ORAL at 12:46

## 2024-07-31 ASSESSMENT — PAIN SCALES - GENERAL
PAINLEVEL_OUTOF10: 0

## 2024-07-31 NOTE — PROGRESS NOTES
Pt educated on the importance of collecting a stool sample. Pt verbalized understanding. There is a collection hat on the toilet for the pt's sample. She was educated on how to use it.

## 2024-07-31 NOTE — PLAN OF CARE
Problem: Physical Therapy - Adult  Goal: By Discharge: Performs mobility at highest level of function for planned discharge setting.  See evaluation for individualized goals.  Description: Physical Therapy Goals:  Initiated 7/29/2024 to be met within 7-10 days.    1.  Patient will move from supine to sit and sit to supine  in bed with independence.    2.  Patient will transfer from bed to chair and chair to bed with independence.  3.  Patient will perform sit to stand with independence.  4.  Patient will ambulate with independence for 150 feet.     PLOF: independent with mobility without an assistive device     Outcome: Progressing   PHYSICAL THERAPY TREATMENT    Patient: Diana Gastelum (63 y.o. female)  Date: 7/31/2024  Diagnosis: Septicemia (HCC) [A41.9]  Sepsis (HCC) [A41.9]  Pneumonia of left lower lobe due to infectious organism [J18.9] Sepsis (HCC)      Precautions: Fall Risk, Contact Precautions,  ,  ,  ,  ,  ,  ,      ASSESSMENT:  Pt sitting EOB upon entering room, agreeable to PT treatment session.  Pt was Supervision for STS transfer, ambulated ~ 60' in the hallway with CGA and no AD.  Pt required 3 standing rest breaks with verbal cues to ensure she is clearing the floor with each step.  Pt with slow shuffling gait, fair balance and demonstrates SOB post.  After seated rest break pt performed standing therapeutic exercises as listed below requiring seated rest breaks in between each exercise.  Pt educated on importance of slowly returning to normal activity level upon returning home, pt eager to get back to cycling.  Pt left sitting EOB at end of treatment session with all needs met and call button within reach.     Progression toward goals:   [x]      Improving appropriately and progressing toward goals  []      Improving slowly and progressing toward goals  []      Not making progress toward goals and plan of care will be adjusted     PLAN:  Patient continues to benefit from skilled intervention

## 2024-07-31 NOTE — CONSENT
Informed Consent for Blood Component Transfusion Note    I have discussed with the patient and friend the rationale for blood component transfusion; its benefits in treating or preventing fatigue, organ damage, or death; and its risk which includes mild transfusion reactions, rare risk of blood borne infection, or more serious but rare reactions. I have discussed the alternatives to transfusion, including the risk and consequences of not receiving transfusion. The patient and friend had an opportunity to ask questions and had agreed to proceed with transfusion of blood components.    Electronically signed by Andrea Mni DO on 7/31/24 at 1:58 PM EDT

## 2024-07-31 NOTE — PLAN OF CARE
Problem: Safety - Adult  Goal: Free from fall injury  7/30/2024 2108 by Joselyn Dominguez RN  Outcome: Progressing  7/30/2024 1705 by Eric Tariq RN  Outcome: Progressing     Problem: Pain  Goal: Verbalizes/displays adequate comfort level or baseline comfort level  7/30/2024 2108 by Joselyn Dominguez RN  Outcome: Progressing  Flowsheets  Taken 7/30/2024 1821 by Eric Tariq RN  Verbalizes/displays adequate comfort level or baseline comfort level:   Encourage patient to monitor pain and request assistance   Assess pain using appropriate pain scale  Taken 7/30/2024 1721 by Eric Tariq RN  Verbalizes/displays adequate comfort level or baseline comfort level:   Encourage patient to monitor pain and request assistance   Assess pain using appropriate pain scale  7/30/2024 1705 by Eric Tariq RN  Outcome: Progressing  Flowsheets  Taken 7/30/2024 1200  Verbalizes/displays adequate comfort level or baseline comfort level:   Encourage patient to monitor pain and request assistance   Assess pain using appropriate pain scale   Administer analgesics based on type and severity of pain and evaluate response  Taken 7/30/2024 1057  Verbalizes/displays adequate comfort level or baseline comfort level:   Encourage patient to monitor pain and request assistance   Assess pain using appropriate pain scale   Administer analgesics based on type and severity of pain and evaluate response  Taken 7/30/2024 1047  Verbalizes/displays adequate comfort level or baseline comfort level:   Encourage patient to monitor pain and request assistance   Assess pain using appropriate pain scale

## 2024-08-01 VITALS
BODY MASS INDEX: 24.75 KG/M2 | HEART RATE: 75 BPM | WEIGHT: 145 LBS | TEMPERATURE: 97.6 F | OXYGEN SATURATION: 97 % | SYSTOLIC BLOOD PRESSURE: 136 MMHG | RESPIRATION RATE: 18 BRPM | DIASTOLIC BLOOD PRESSURE: 85 MMHG | HEIGHT: 64 IN

## 2024-08-01 LAB
ABO + RH BLD: NORMAL
ANION GAP SERPL CALC-SCNC: 5 MMOL/L (ref 3–18)
BASOPHILS # BLD: 0.1 K/UL (ref 0–0.1)
BASOPHILS NFR BLD: 1 % (ref 0–2)
BLOOD GROUP ANTIBODIES SERPL: NORMAL
BUN SERPL-MCNC: 5 MG/DL (ref 7–18)
BUN/CREAT SERPL: 9 (ref 12–20)
CALCIUM SERPL-MCNC: 9.2 MG/DL (ref 8.5–10.1)
CHLORIDE SERPL-SCNC: 112 MMOL/L (ref 100–111)
CO2 SERPL-SCNC: 23 MMOL/L (ref 21–32)
CREAT SERPL-MCNC: 0.54 MG/DL (ref 0.6–1.3)
DIFFERENTIAL METHOD BLD: ABNORMAL
EOSINOPHIL # BLD: 0.4 K/UL (ref 0–0.4)
EOSINOPHIL NFR BLD: 4 % (ref 0–5)
ERYTHROCYTE [DISTWIDTH] IN BLOOD BY AUTOMATED COUNT: 14.4 % (ref 11.6–14.5)
GLUCOSE SERPL-MCNC: 107 MG/DL (ref 74–99)
HCT VFR BLD AUTO: 26 % (ref 35–45)
HEMOCCULT STL QL: POSITIVE
HGB BLD-MCNC: 8.2 G/DL (ref 12–16)
IMM GRANULOCYTES # BLD AUTO: 0.4 K/UL (ref 0–0.04)
IMM GRANULOCYTES NFR BLD AUTO: 4 % (ref 0–0.5)
LYMPHOCYTES # BLD: 2.3 K/UL (ref 0.9–3.6)
LYMPHOCYTES NFR BLD: 24 % (ref 21–52)
MCH RBC QN AUTO: 28.9 PG (ref 24–34)
MCHC RBC AUTO-ENTMCNC: 31.5 G/DL (ref 31–37)
MCV RBC AUTO: 91.5 FL (ref 78–100)
MONOCYTES # BLD: 0.7 K/UL (ref 0.05–1.2)
MONOCYTES NFR BLD: 7 % (ref 3–10)
NEUTS SEG # BLD: 5.9 K/UL (ref 1.8–8)
NEUTS SEG NFR BLD: 60 % (ref 40–73)
NRBC # BLD: 0 K/UL (ref 0–0.01)
NRBC BLD-RTO: 0 PER 100 WBC
PLATELET # BLD AUTO: 487 K/UL (ref 135–420)
PMV BLD AUTO: 9.9 FL (ref 9.2–11.8)
POTASSIUM SERPL-SCNC: 4.2 MMOL/L (ref 3.5–5.5)
RBC # BLD AUTO: 2.84 M/UL (ref 4.2–5.3)
SODIUM SERPL-SCNC: 140 MMOL/L (ref 136–145)
SPECIMEN EXP DATE BLD: NORMAL
WBC # BLD AUTO: 9.7 K/UL (ref 4.6–13.2)

## 2024-08-01 PROCEDURE — 6360000002 HC RX W HCPCS: Performed by: INTERNAL MEDICINE

## 2024-08-01 PROCEDURE — 80048 BASIC METABOLIC PNL TOTAL CA: CPT

## 2024-08-01 PROCEDURE — 99239 HOSP IP/OBS DSCHRG MGMT >30: CPT | Performed by: INTERNAL MEDICINE

## 2024-08-01 PROCEDURE — 86850 RBC ANTIBODY SCREEN: CPT

## 2024-08-01 PROCEDURE — 6370000000 HC RX 637 (ALT 250 FOR IP): Performed by: INTERNAL MEDICINE

## 2024-08-01 PROCEDURE — 6360000002 HC RX W HCPCS: Performed by: STUDENT IN AN ORGANIZED HEALTH CARE EDUCATION/TRAINING PROGRAM

## 2024-08-01 PROCEDURE — 36415 COLL VENOUS BLD VENIPUNCTURE: CPT

## 2024-08-01 PROCEDURE — 86901 BLOOD TYPING SEROLOGIC RH(D): CPT

## 2024-08-01 PROCEDURE — 94761 N-INVAS EAR/PLS OXIMETRY MLT: CPT

## 2024-08-01 PROCEDURE — 85025 COMPLETE CBC W/AUTO DIFF WBC: CPT

## 2024-08-01 PROCEDURE — 86900 BLOOD TYPING SEROLOGIC ABO: CPT

## 2024-08-01 RX ORDER — CEFDINIR 300 MG/1
300 CAPSULE ORAL 2 TIMES DAILY
Qty: 15 CAPSULE | Refills: 0 | Status: SHIPPED | OUTPATIENT
Start: 2024-08-01 | End: 2024-08-09

## 2024-08-01 RX ADMIN — POTASSIUM CHLORIDE AND SODIUM CHLORIDE: 900; 300 INJECTION, SOLUTION INTRAVENOUS at 04:59

## 2024-08-01 RX ADMIN — ENOXAPARIN SODIUM 40 MG: 100 INJECTION SUBCUTANEOUS at 09:20

## 2024-08-01 RX ADMIN — GUAIFENESIN AND DEXTROMETHORPHAN 5 ML: 100; 10 SYRUP ORAL at 05:00

## 2024-08-01 RX ADMIN — GUAIFENESIN AND DEXTROMETHORPHAN 5 ML: 100; 10 SYRUP ORAL at 09:20

## 2024-08-01 ASSESSMENT — PAIN SCALES - GENERAL
PAINLEVEL_OUTOF10: 0

## 2024-08-01 NOTE — CARE COORDINATION
Discharge order noted for today. Orders received. No needs identified at this time. Family will transport pt home. Case management remains available as needed.    Disha RIVASN RN  Case Management

## 2024-08-01 NOTE — PLAN OF CARE
Problem: Pain  Goal: Verbalizes/displays adequate comfort level or baseline comfort level  Flowsheets (Taken 8/1/2024 0441)  Verbalizes/displays adequate comfort level or baseline comfort level:   Encourage patient to monitor pain and request assistance   Assess pain using appropriate pain scale   Implement non-pharmacological measures as appropriate and evaluate response   Notify Licensed Independent Practitioner if interventions unsuccessful or patient reports new pain     Problem: Respiratory - Adult  Goal: Achieves optimal ventilation and oxygenation  Recent Flowsheet Documentation  Taken 7/31/2024 2030 by Ronit Bain RN  Achieves optimal ventilation and oxygenation:   Assess for changes in respiratory status   Position to facilitate oxygenation and minimize respiratory effort     Problem: Metabolic/Fluid and Electrolytes - Adult  Goal: Electrolytes maintained within normal limits  Recent Flowsheet Documentation  Taken 7/31/2024 2030 by Ronit Bain RN  Electrolytes maintained within normal limits: Monitor labs and assess patient for signs and symptoms of electrolyte imbalances

## 2024-08-01 NOTE — PLAN OF CARE
Problem: Pain  Goal: Verbalizes/displays adequate comfort level or baseline comfort level  8/1/2024 1202 by Humberto Booth RN  Outcome: Progressing  8/1/2024 0441 by Ronit Bain RN  Flowsheets (Taken 8/1/2024 0441)  Verbalizes/displays adequate comfort level or baseline comfort level:   Encourage patient to monitor pain and request assistance   Assess pain using appropriate pain scale   Implement non-pharmacological measures as appropriate and evaluate response   Notify Licensed Independent Practitioner if interventions unsuccessful or patient reports new pain     Problem: Respiratory - Adult  Goal: Achieves optimal ventilation and oxygenation  Outcome: Progressing  Flowsheets (Taken 8/1/2024 0752)  Achieves optimal ventilation and oxygenation: Assess for changes in respiratory status     Problem: Discharge Planning  Goal: Discharge to home or other facility with appropriate resources  Outcome: Progressing  Flowsheets (Taken 8/1/2024 0752)  Discharge to home or other facility with appropriate resources: Identify barriers to discharge with patient and caregiver     Problem: Metabolic/Fluid and Electrolytes - Adult  Goal: Electrolytes maintained within normal limits  Outcome: Progressing  Flowsheets (Taken 8/1/2024 0752)  Electrolytes maintained within normal limits: Monitor labs and assess patient for signs and symptoms of electrolyte imbalances  Goal: Hemodynamic stability and optimal renal function maintained  Outcome: Progressing  Flowsheets (Taken 8/1/2024 0752)  Hemodynamic stability and optimal renal function maintained: Monitor labs and assess for signs and symptoms of volume excess or deficit  Goal: Glucose maintained within prescribed range  Outcome: Progressing  Flowsheets (Taken 8/1/2024 0752)  Glucose maintained within prescribed range: Monitor blood glucose as ordered     Problem: Safety - Adult  Goal: Free from fall injury  Outcome: Progressing  Flowsheets (Taken 8/1/2024 0909)  Free From Fall

## 2024-08-01 NOTE — PROGRESS NOTES
Discharge instructions given to patient. Opportunity provided for clarifying questions. All answered to patient satisfaction. IV removed by GALLITO Paul. ID removed and shredded. Patient given return to work note. Discharged via wheelchair.

## 2024-08-01 NOTE — DISCHARGE INSTRUCTIONS
Establish with and see New Primary Care Provider (PCP) in 2-5 days.  Request Primary Care Provider (PCP) refer you to Gastroenterological Physician for GI Bleed and routine Colonoscopy screening.    Monitor for frankly blood or black, tar-like stool (melena).  Should you experience weakness, fatigue, worsening shortness of breath, worsening dyspnea on exertion, nausea, or faintness, please proceed to be evaluated for GI Bleed and/or Anemia at an Emergency Room.  DISCHARGE SUMMARY from Nurse    PATIENT INSTRUCTIONS:    After general anesthesia or intravenous sedation, for 24 hours or while taking prescription Narcotics:  Limit your activities  Do not drive and operate hazardous machinery  Do not make important personal or business decisions  Do  not drink alcoholic beverages  If you have not urinated within 8 hours after discharge, please contact your surgeon on call.    Report the following to your surgeon:  Excessive pain, swelling, redness or odor of or around the surgical area  Temperature over 100.5  Nausea and vomiting lasting longer than 4 hours or if unable to take medications  Any signs of decreased circulation or nerve impairment to extremity: change in color, persistent  numbness, tingling, coldness or increase pain  Any questions    What to do at Home:  Recommended activity: activity as tolerated,     If you experience any of the following symptoms Refer to discharge instructions, please follow up with [facility] P[facility].    *  Please give a list of your current medications to your Primary Care Provider.    *  Please update this list whenever your medications are discontinued, doses are      changed, or new medications (including over-the-counter products) are added.    *  Please carry medication information at all times in case of emergency situations.    These are general instructions for a healthy lifestyle:    No smoking/ No tobacco products/ Avoid exposure to second hand smoke  Surgeon General's Warning:

## 2024-08-01 NOTE — PROGRESS NOTES
Infectious Disease progress Note        Reason: sepsis, pneumonia, persistent fever    Current abx Prior abx   Ceftriaxone, azithromycin since 7/27      Lines:       Assessment :   63 y.o. female with past Medical History of Mastoidectomy who presents to [facility]  ER on 7/27/24 with complaint of Cough, Shortness of Breath, and Fatigue.     Clinical presentation consistent with sepsis-present on admission due to left upper lung community-acquired pneumonia- ?  Pneumococcal    Sputum culture 7/29-gram-positive cocci in pairs    Intermittent high-grade fevers despite current broad-spectrum antibiotics likely due to increased severity of pneumonia.  Monitor for parapneumonic complications  Small pleural effusion noted on CT chest 7/29 likely reactive    Diarrhea-resolved.  Negative stool C. difficile test 7/28    Resolved fevers.  No further hemoptysis.  H&H maintained.  Subjective sense of improvement.    Recommendations:    D/c iv ceftriaxone, azithromycin- start po cefdinir 300 mg po bid till 8/9/24  Mx of anemia per primary team  Recommend Robitussin DM  Discharge planning per primary team     Above plan was discussed in details with patient,  and dr Min. Please call me if any further questions or concerns. Will continue to participate in the care of this patient.  HPI:       Patient states that she feels better today.  Eager to go home.  No subjective fever, chills    Past Medical History:   Diagnosis Date    Bilateral hearing loss     Tobacco abuse 05/2024    0.25-0.3 PPD x2 months (as of 7/28/2024).       Past Surgical History:   Procedure Laterality Date    [facility]         [email or web address]    Current Facility-Administered Medications   Medication Dose Route Frequency    LORazepam (ATIVAN) tablet 0.5 mg  0.5 mg Oral Nightly PRN    ipratropium 0.5 mg-albuterol 2.5 mg (DUONEB) nebulizer solution 1 Dose  1 Dose Inhalation Q4H PRN    cefTRIAXone (ROCEPHIN) 2,000 mg in sterile water 20 mL IV syringe  2,000  mg IntraVENous Q24H    guaiFENesin-dextromethorphan (ROBITUSSIN DM) 100-10 MG/5ML syrup 5 mL  5 mL Oral Q6H    0.9% NaCl with KCl 40 mEq infusion   IntraVENous Continuous    ibuprofen (ADVIL;MOTRIN) tablet 400 mg  400 mg Oral Q6H PRN    sodium chloride flush 0.9 % injection 5-40 mL  5-40 mL IntraVENous 2 times per day    sodium chloride flush 0.9 % injection 5-40 mL  5-40 mL IntraVENous PRN    0.9 % sodium chloride infusion   IntraVENous PRN    potassium chloride (KLOR-CON M) extended release tablet 40 mEq  40 mEq Oral PRN    Or    potassium bicarb-citric acid (EFFER-K) effervescent tablet 40 mEq  40 mEq Oral PRN    Or    potassium chloride 10 mEq/100 mL IVPB (Peripheral Line)  10 mEq IntraVENous PRN    magnesium sulfate 2000 mg in 50 mL IVPB premix  2,000 mg IntraVENous PRN    enoxaparin (LOVENOX) injection 40 mg  40 mg SubCUTAneous Daily    ondansetron (ZOFRAN-ODT) disintegrating tablet 4 mg  4 mg Oral Q8H PRN    Or    ondansetron (ZOFRAN) injection 4 mg  4 mg IntraVENous Q6H PRN    polyethylene glycol (GLYCOLAX) packet 17 g  17 g Oral Daily PRN    acetaminophen (TYLENOL) tablet 650 mg  650 mg Oral Q6H PRN    Or    acetaminophen (TYLENOL) suppository 650 mg  650 mg Rectal Q6H PRN    potassium chloride 10 mEq/100 mL IVPB (Peripheral Line)  10 mEq IntraVENous PRN    melatonin disintegrating tablet 5 mg  5 mg Oral Nightly PRN    azithromycin (ZITHROMAX) 500 mg in sodium chloride 0.9 % 250 mL IVPB ([id number])  500 mg IntraVENous Q24H       Allergies: Patient has no known allergies.    Family History   Problem Relation Age of Onset    Heart Failure Mother         Heart Transplant    Arthritis Sister     Diabetes Brother      Social History     Socioeconomic History    Marital status:      Spouse name: Not on file    Number of children: Not on file    Years of education: Not on file    Highest education level: Not on file   Occupational History    Not on file   Tobacco Use    Smoking status: Never     Passive

## 2024-08-01 NOTE — PROGRESS NOTES
Infectious Disease Consultation Note        Reason: sepsis, pneumonia, persistent fever    Current abx Prior abx   Ceftriaxone, azithromycin since 7/27      Lines:       Assessment :   63 y.o. female with past Medical History of Mastoidectomy who presents to [facility]  ER on 7/27/24 with complaint of Cough, Shortness of Breath, and Fatigue.     Clinical presentation consistent with sepsis-present on admission due to left upper lung community-acquired pneumonia- ?  Pneumococcal    Sputum culture 7/29-gram-positive cocci in pairs    Intermittent high-grade fevers despite current broad-spectrum antibiotics likely due to increased severity of pneumonia.  Monitor for parapneumonic complications  Small pleural effusion noted on CT chest 7/29 likely reactive    Diarrhea-resolved.  Negative stool C. difficile test 7/28    Resolved fevers.  Now with drop in h/h, scant hemoptysis- monitor for occult bleeding, worsening hemoptysis    Recommendations:    Continue ceftriaxone, azithromycin  Mx of anemia per primary team  Recommend Robitussin DM  Monitor CBC, temperature, procalcitonin, clinically to determine duration/type of antibiotics, need for further interventions     Above plan was discussed in details with patient, family and dr Jaclyn RN. Please call me if any further questions or concerns. Will continue to participate in the care of this patient.  HPI:       Patient states that she feels better today compared to day of admission.  Improved headache , cough. Scant blood tinged sputum. No subjective fever, chills    Past Medical History:   Diagnosis Date    Bilateral hearing loss     Tobacco abuse 05/2024    0.25-0.3 PPD x2 months (as of 7/28/2024).       Past Surgical History:   Procedure Laterality Date    [facility]         [email or web address]    Current Facility-Administered Medications   Medication Dose Route Frequency    LORazepam (ATIVAN) tablet 0.5 mg  0.5 mg Oral Nightly PRN    ipratropium 0.5 mg-albuterol 2.5  been authorized by the FDA under an Emergency Use Authorization (EUA) for use by authorized laboratories.   Fact sheet for Healthcare Providers:  https://www.fda.gov/media/445577/download  Fact sheet for Patients: https://www.fda.gov/media/719609/download     Methodology: Isothermal Nucleic Acid Amplification         Rapid influenza A/B antigens [[id number]] Collected: 07/27/24 0942    Order Status: Completed Specimen: Nasal Washing Updated: 07/27/24 1036     Influenza A Ag Negative        Comment: A negative result does not exclude influenza virus infection, seasonal or H1N1 due to suboptimal sensitivity. If influenza is circulating in your community, a diagnosis of influenza should be considered based on a patients clinical presentation and empiric antiviral treatment should be considered, if indicated.        Influenza B Ag Negative       Blood Culture 1 [[id number]] Collected: 07/27/24 0933    Order Status: Completed Specimen: Blood Updated: 07/31/24 0659     Special Requests NO SPECIAL REQUESTS        Culture NO GROWTH 4 DAYS                   RADIOLOGY:    All available imaging studies/reports in [facility] for this admission were reviewed    High complexity decision making was performed during the evaluation of this patient at high risk for decompensation      Above mentioned total time spent on reviewing the case/medical record/data/notes/EMR/patient examination/documentation/coordinating care with nurse/consultants, exclusive of procedures with complex decision making performed and > 50% time spent in face to face evaluation.        Disclaimer: Sections of this note are dictated utilizing voice recognition software, which may have resulted in some phonetic based errors in grammar and contents. Even though attempts were made to correct all the mistakes, some may have been missed, and remained in the body of the document. If questions arise, please contact our department.    Dr. Taniya Martin, Infectious

## 2024-08-01 NOTE — PLAN OF CARE
Problem: Pain  Goal: Verbalizes/displays adequate comfort level or baseline comfort level  8/1/2024 1255 by Humberto Booth RN  Outcome: Adequate for Discharge  8/1/2024 1202 by Humberto Booth RN  Outcome: Progressing  8/1/2024 0441 by Ronit Bain RN  Flowsheets (Taken 8/1/2024 0441)  Verbalizes/displays adequate comfort level or baseline comfort level:   Encourage patient to monitor pain and request assistance   Assess pain using appropriate pain scale   Implement non-pharmacological measures as appropriate and evaluate response   Notify Licensed Independent Practitioner if interventions unsuccessful or patient reports new pain     Problem: Respiratory - Adult  Goal: Achieves optimal ventilation and oxygenation  8/1/2024 1255 by Humberto Booth RN  Outcome: Adequate for Discharge  8/1/2024 1202 by Humberto Booth RN  Outcome: Progressing  Flowsheets (Taken 8/1/2024 0752)  Achieves optimal ventilation and oxygenation: Assess for changes in respiratory status     Problem: Discharge Planning  Goal: Discharge to home or other facility with appropriate resources  8/1/2024 1255 by Humberto Booth RN  Outcome: Adequate for Discharge  8/1/2024 1202 by Humberto Booth RN  Outcome: Progressing  Flowsheets (Taken 8/1/2024 0752)  Discharge to home or other facility with appropriate resources: Identify barriers to discharge with patient and caregiver     Problem: Metabolic/Fluid and Electrolytes - Adult  Goal: Electrolytes maintained within normal limits  8/1/2024 1255 by Humberto Booth RN  Outcome: Adequate for Discharge  8/1/2024 1202 by Humberto Booth RN  Outcome: Progressing  Flowsheets (Taken 8/1/2024 0752)  Electrolytes maintained within normal limits: Monitor labs and assess patient for signs and symptoms of electrolyte imbalances  Goal: Hemodynamic stability and optimal renal function maintained  8/1/2024 1255 by Humberto Booth RN  Outcome: Adequate for Discharge  8/1/2024

## 2024-08-01 NOTE — PROGRESS NOTES
The discharge information has been reviewed with the patient.  The patient verbalized understanding. IV removed.  Discharge medications reviewed with the patient.

## 2024-08-01 NOTE — DISCHARGE SUMMARY
Discharge Summary    Date of Service:  8/01/2024  Admission Date:  7/27/2024  Discharge Date:  8/01/2024  Attending:  Andrea Min D.O.  PCP:  No PCP    Admission Diagnoses:    Discharge Diagnoses:    Consults:  Procedures:  Diagnostic Imaging:    HPI (per admitting {Single Blank:24163}, ):    Hospital Course:    Follow-Up Labs:  None    Discharge Medications:  Cefdinir PO BID x15 doses    Condition:  Stable  Disposition:  Home  Activity:  As Tolerated  Diet:  Regular Diet    Discharge Instructions:  Establish with and see New Primary Care Provider (PCP) in 2-5 days.  Request Primary Care Provider (PCP) refer you to Gastroenterological Physician for GI Bleed and routine Colonoscopy screening.    Patient will follow-up with [facility] on 8/30/2024 (information included on Discharge Paperwork).    Discharge Recommendations:  Monitor for frankly blood or black, tar-like stool (melena). Should you experience weakness, fatigue, worsening shortness of breath, worsening dyspnea on exertion, nausea, or faintness, please proceed to be evaluated for GI Bleed and/or Anemia at an Emergency Room.       Carbon copy to Patient's Primary Care Physician (a.k.a. PCP) ().  Discharge Planning took 14 + minutes.   without chest congestion and without pain with inspiration, but with intermittent fevers and chills over approximately the last week.  Patient reports that she has been having a headache during this time period and diarrhea for approximately four days.  Patient reports that she has not taken any antibiotics recently.  Patient reports that she has a Past Medical History of Bilateral Hearing Loss for which she used to wear Hearing Aids (which she reportedly lost).  Patient states that she has a Past Medical History of Mastoidectomy.  Patient reports that she takes no medications at home and has no Home Pharmacy.  Patient reports that she Smokes 0.25-0.3 PPD x2 months and ostensibly started Smoking due to the recently loss of her  who passed away.  Patient denies nausea and dysuria.     In [facility], Patient is noted to have Temperature 101.0°F, Heart Rate 117 bpm, Respiration Rate 22 bpm, Blood Pressure 100/66 mm Hg to 134/80 mm Hg (believe DBP of 107 mm Hg was erroneous), SpO2 93% on Room Air, WBC 13.8 K/uL, Hgb 11.0 g/dL, Neut% 82%, Neut# 11.3, UA shows small hematuria, Lactic Acid 1.48 mmol/L, Na+ 128 mmol/L, K+ 3.5 mmol/L, Cl- 95 mmol/L, BUN 19 mg/dL, Creatinine 0.86 mg/dL (Unknown), eGFR 77, Glucose 116 mg/dL, Albumin 2.7 g/dL, ALT 58 U/L, AST 94 U/L, Total Bilirubin 1.9 mg/dL, Troponin 13 ng/L, Pro- pg/mL, Procalcitonin 2.66 ng/mL, TSH 1.01 uIU/mL, Influenza A/B (-), and SARS-CoV-2 (-).  CXR has been read by Radiologist to show \"Fairly extensive region of airspace disease/infiltrate centered in the left midlung laterally as described. Small left pleural effusion.\"  Patient received PO Acetaminophen 1,000 mg, IV Ceftriaxone 1,000 mg, IV Azithromycin 500 mg, PO Ibuprofen 600 mg, and IV NS 2,000 mL in [facility].     Patient is admitted to [facility] Medical/Surgical Unit for management of Sepsis 2°/2 Left Community-Acquired Pneumonia (a.k.a. CAP) with Small Left Pleural

## 2024-08-02 LAB
BACTERIA SPEC CULT: NORMAL
BACTERIA SPEC CULT: NORMAL
SERVICE CMNT-IMP: NORMAL
SERVICE CMNT-IMP: NORMAL